# Patient Record
Sex: MALE | Race: BLACK OR AFRICAN AMERICAN | NOT HISPANIC OR LATINO | Employment: FULL TIME | URBAN - METROPOLITAN AREA
[De-identification: names, ages, dates, MRNs, and addresses within clinical notes are randomized per-mention and may not be internally consistent; named-entity substitution may affect disease eponyms.]

---

## 2020-03-06 ENCOUNTER — HOSPITAL ENCOUNTER (INPATIENT)
Facility: CLINIC | Age: 40
LOS: 4 days | Discharge: HOME | DRG: 076 | End: 2020-03-10
Attending: EMERGENCY MEDICINE | Admitting: INTERNAL MEDICINE
Payer: COMMERCIAL

## 2020-03-06 ENCOUNTER — APPOINTMENT (OUTPATIENT)
Dept: GENERAL RADIOLOGY | Facility: CLINIC | Age: 40
DRG: 076 | End: 2020-03-06
Attending: EMERGENCY MEDICINE
Payer: COMMERCIAL

## 2020-03-06 DIAGNOSIS — R51.9 ACUTE NONINTRACTABLE HEADACHE, UNSPECIFIED HEADACHE TYPE: ICD-10-CM

## 2020-03-06 DIAGNOSIS — G03.9 MENINGITIS: Primary | ICD-10-CM

## 2020-03-06 DIAGNOSIS — B34.9 VIRAL SYNDROME: ICD-10-CM

## 2020-03-06 DIAGNOSIS — A87.2 LYMPHOCYTIC MENINGITIS: ICD-10-CM

## 2020-03-06 DIAGNOSIS — D50.9 MICROCYTIC ANEMIA: ICD-10-CM

## 2020-03-06 LAB
ANION GAP SERPL CALCULATED.3IONS-SCNC: 5 MMOL/L (ref 3–14)
APPEARANCE CSF: CLEAR
BASOPHILS # BLD AUTO: 0 10E9/L (ref 0–0.2)
BASOPHILS NFR BLD AUTO: 0.6 %
BUN SERPL-MCNC: 9 MG/DL (ref 7–30)
CALCIUM SERPL-MCNC: 8.6 MG/DL (ref 8.5–10.1)
CHLORIDE SERPL-SCNC: 106 MMOL/L (ref 94–109)
CO2 SERPL-SCNC: 26 MMOL/L (ref 20–32)
COLOR CSF: COLORLESS
CREAT SERPL-MCNC: 1 MG/DL (ref 0.66–1.25)
DIFFERENTIAL METHOD BLD: ABNORMAL
EOSINOPHIL # BLD AUTO: 0.1 10E9/L (ref 0–0.7)
EOSINOPHIL NFR BLD AUTO: 0.9 %
EOSINOPHIL NFR CSF MANUAL: 1 %
ERYTHROCYTE [DISTWIDTH] IN BLOOD BY AUTOMATED COUNT: 17 % (ref 10–15)
FLUAV+FLUBV AG SPEC QL: NEGATIVE
FLUAV+FLUBV AG SPEC QL: NEGATIVE
GFR SERPL CREATININE-BSD FRML MDRD: >90 ML/MIN/{1.73_M2}
GLUCOSE CSF-MCNC: 31 MG/DL (ref 40–70)
GLUCOSE SERPL-MCNC: 75 MG/DL (ref 70–99)
GRAM STN SPEC: NORMAL
HCT VFR BLD AUTO: 35.4 % (ref 40–53)
HGB BLD-MCNC: 10.6 G/DL (ref 13.3–17.7)
IMM GRANULOCYTES # BLD: 0 10E9/L (ref 0–0.4)
IMM GRANULOCYTES NFR BLD: 0.2 %
LYMPH ABN NFR CSF MANUAL: 73 %
LYMPHOCYTES # BLD AUTO: 0.9 10E9/L (ref 0.8–5.3)
LYMPHOCYTES NFR BLD AUTO: 17.6 %
MCH RBC QN AUTO: 20.9 PG (ref 26.5–33)
MCHC RBC AUTO-ENTMCNC: 29.9 G/DL (ref 31.5–36.5)
MCV RBC AUTO: 70 FL (ref 78–100)
MONOCYTES # BLD AUTO: 0.5 10E9/L (ref 0–1.3)
MONOCYTES NFR BLD AUTO: 8.9 %
MONOS+MACROS NFR CSF MANUAL: 25 %
NEUTROPHILS # BLD AUTO: 3.8 10E9/L (ref 1.6–8.3)
NEUTROPHILS NFR BLD AUTO: 71.8 %
NEUTROPHILS NFR CSF MANUAL: 1 %
NRBC # BLD AUTO: 0 10*3/UL
NRBC BLD AUTO-RTO: 0 /100
PLATELET # BLD AUTO: 218 10E9/L (ref 150–450)
POTASSIUM SERPL-SCNC: 3.7 MMOL/L (ref 3.4–5.3)
PROT CSF-MCNC: 99 MG/DL (ref 15–60)
RBC # BLD AUTO: 5.07 10E12/L (ref 4.4–5.9)
RBC # CSF MANUAL: 4 /UL (ref 0–2)
S PNEUM AG SPEC QL: NORMAL
SODIUM SERPL-SCNC: 137 MMOL/L (ref 133–144)
SPECIMEN SOURCE: NORMAL
SPECIMEN VOL CSF: 8 ML
TUBE # CSF: 4 #
WBC # BLD AUTO: 5.3 10E9/L (ref 4–11)
WBC # CSF MANUAL: 645 /UL (ref 0–5)

## 2020-03-06 PROCEDURE — 87529 HSV DNA AMP PROBE: CPT | Performed by: EMERGENCY MEDICINE

## 2020-03-06 PROCEDURE — 62270 DX LMBR SPI PNXR: CPT

## 2020-03-06 PROCEDURE — 12000000 ZZH R&B MED SURG/OB

## 2020-03-06 PROCEDURE — 87015 SPECIMEN INFECT AGNT CONCNTJ: CPT | Performed by: EMERGENCY MEDICINE

## 2020-03-06 PROCEDURE — 87070 CULTURE OTHR SPECIMN AEROBIC: CPT | Performed by: EMERGENCY MEDICINE

## 2020-03-06 PROCEDURE — 96361 HYDRATE IV INFUSION ADD-ON: CPT

## 2020-03-06 PROCEDURE — 009U3ZX DRAINAGE OF SPINAL CANAL, PERCUTANEOUS APPROACH, DIAGNOSTIC: ICD-10-PCS | Performed by: EMERGENCY MEDICINE

## 2020-03-06 PROCEDURE — 87205 SMEAR GRAM STAIN: CPT | Performed by: EMERGENCY MEDICINE

## 2020-03-06 PROCEDURE — 85025 COMPLETE CBC W/AUTO DIFF WBC: CPT | Performed by: EMERGENCY MEDICINE

## 2020-03-06 PROCEDURE — 80048 BASIC METABOLIC PNL TOTAL CA: CPT | Performed by: EMERGENCY MEDICINE

## 2020-03-06 PROCEDURE — 25800030 ZZH RX IP 258 OP 636: Performed by: INTERNAL MEDICINE

## 2020-03-06 PROCEDURE — 99223 1ST HOSP IP/OBS HIGH 75: CPT | Mod: AI | Performed by: INTERNAL MEDICINE

## 2020-03-06 PROCEDURE — 87899 AGENT NOS ASSAY W/OPTIC: CPT | Performed by: EMERGENCY MEDICINE

## 2020-03-06 PROCEDURE — 25800030 ZZH RX IP 258 OP 636: Performed by: EMERGENCY MEDICINE

## 2020-03-06 PROCEDURE — 96365 THER/PROPH/DIAG IV INF INIT: CPT

## 2020-03-06 PROCEDURE — 82945 GLUCOSE OTHER FLUID: CPT | Performed by: EMERGENCY MEDICINE

## 2020-03-06 PROCEDURE — 25000132 ZZH RX MED GY IP 250 OP 250 PS 637: Performed by: EMERGENCY MEDICINE

## 2020-03-06 PROCEDURE — 96375 TX/PRO/DX INJ NEW DRUG ADDON: CPT

## 2020-03-06 PROCEDURE — 99285 EMERGENCY DEPT VISIT HI MDM: CPT | Mod: 25

## 2020-03-06 PROCEDURE — 25000128 H RX IP 250 OP 636: Performed by: EMERGENCY MEDICINE

## 2020-03-06 PROCEDURE — 87804 INFLUENZA ASSAY W/OPTIC: CPT | Performed by: EMERGENCY MEDICINE

## 2020-03-06 PROCEDURE — 89051 BODY FLUID CELL COUNT: CPT | Performed by: EMERGENCY MEDICINE

## 2020-03-06 PROCEDURE — 87498 ENTEROVIRUS PROBE&REVRS TRNS: CPT | Performed by: EMERGENCY MEDICINE

## 2020-03-06 PROCEDURE — 71046 X-RAY EXAM CHEST 2 VIEWS: CPT

## 2020-03-06 PROCEDURE — 84157 ASSAY OF PROTEIN OTHER: CPT | Performed by: EMERGENCY MEDICINE

## 2020-03-06 RX ORDER — FENTANYL CITRATE 50 UG/ML
100 INJECTION, SOLUTION INTRAMUSCULAR; INTRAVENOUS ONCE
Status: COMPLETED | OUTPATIENT
Start: 2020-03-06 | End: 2020-03-06

## 2020-03-06 RX ORDER — LIDOCAINE HYDROCHLORIDE 10 MG/ML
INJECTION, SOLUTION EPIDURAL; INFILTRATION; INTRACAUDAL; PERINEURAL
Status: DISCONTINUED
Start: 2020-03-06 | End: 2020-03-06 | Stop reason: HOSPADM

## 2020-03-06 RX ORDER — METOCLOPRAMIDE HYDROCHLORIDE 5 MG/ML
10 INJECTION INTRAMUSCULAR; INTRAVENOUS ONCE
Status: COMPLETED | OUTPATIENT
Start: 2020-03-06 | End: 2020-03-06

## 2020-03-06 RX ORDER — ONDANSETRON 2 MG/ML
4 INJECTION INTRAMUSCULAR; INTRAVENOUS ONCE
Status: COMPLETED | OUTPATIENT
Start: 2020-03-06 | End: 2020-03-06

## 2020-03-06 RX ORDER — LIDOCAINE 40 MG/G
CREAM TOPICAL
Status: DISCONTINUED | OUTPATIENT
Start: 2020-03-06 | End: 2020-03-10 | Stop reason: HOSPADM

## 2020-03-06 RX ORDER — IBUPROFEN 600 MG/1
600 TABLET, FILM COATED ORAL EVERY 8 HOURS PRN
Qty: 30 TABLET | Refills: 0 | Status: SHIPPED | OUTPATIENT
Start: 2020-03-06 | End: 2020-03-07

## 2020-03-06 RX ORDER — AMOXICILLIN 250 MG
1 CAPSULE ORAL 2 TIMES DAILY PRN
Status: DISCONTINUED | OUTPATIENT
Start: 2020-03-06 | End: 2020-03-10 | Stop reason: HOSPADM

## 2020-03-06 RX ORDER — KETOROLAC TROMETHAMINE 15 MG/ML
15 INJECTION, SOLUTION INTRAMUSCULAR; INTRAVENOUS ONCE
Status: COMPLETED | OUTPATIENT
Start: 2020-03-06 | End: 2020-03-06

## 2020-03-06 RX ORDER — POTASSIUM CHLORIDE 1.5 G/1.58G
20-40 POWDER, FOR SOLUTION ORAL
Status: DISCONTINUED | OUTPATIENT
Start: 2020-03-06 | End: 2020-03-10 | Stop reason: HOSPADM

## 2020-03-06 RX ORDER — ACETAMINOPHEN 325 MG/1
650 TABLET ORAL EVERY 4 HOURS PRN
Status: DISCONTINUED | OUTPATIENT
Start: 2020-03-06 | End: 2020-03-10 | Stop reason: HOSPADM

## 2020-03-06 RX ORDER — CODEINE PHOSPHATE AND GUAIFENESIN 10; 100 MG/5ML; MG/5ML
1 SOLUTION ORAL EVERY 4 HOURS PRN
Qty: 120 ML | Refills: 0 | Status: SHIPPED | OUTPATIENT
Start: 2020-03-06

## 2020-03-06 RX ORDER — NAPROXEN 250 MG/1
250 TABLET ORAL 2 TIMES DAILY PRN
Status: DISCONTINUED | OUTPATIENT
Start: 2020-03-06 | End: 2020-03-10 | Stop reason: HOSPADM

## 2020-03-06 RX ORDER — DIPHENHYDRAMINE HYDROCHLORIDE 50 MG/ML
12.5 INJECTION INTRAMUSCULAR; INTRAVENOUS ONCE
Status: COMPLETED | OUTPATIENT
Start: 2020-03-06 | End: 2020-03-06

## 2020-03-06 RX ORDER — BISACODYL 10 MG
10 SUPPOSITORY, RECTAL RECTAL DAILY PRN
Status: DISCONTINUED | OUTPATIENT
Start: 2020-03-06 | End: 2020-03-10 | Stop reason: HOSPADM

## 2020-03-06 RX ORDER — PROCHLORPERAZINE 25 MG
25 SUPPOSITORY, RECTAL RECTAL EVERY 12 HOURS PRN
Status: DISCONTINUED | OUTPATIENT
Start: 2020-03-06 | End: 2020-03-10 | Stop reason: HOSPADM

## 2020-03-06 RX ORDER — ONDANSETRON 4 MG/1
4 TABLET, ORALLY DISINTEGRATING ORAL EVERY 6 HOURS PRN
Status: DISCONTINUED | OUTPATIENT
Start: 2020-03-06 | End: 2020-03-10 | Stop reason: HOSPADM

## 2020-03-06 RX ORDER — HYDROXYCHLOROQUINE SULFATE 200 MG/1
400 TABLET, FILM COATED ORAL
COMMUNITY

## 2020-03-06 RX ORDER — POTASSIUM CHLORIDE 29.8 MG/ML
20 INJECTION INTRAVENOUS
Status: DISCONTINUED | OUTPATIENT
Start: 2020-03-06 | End: 2020-03-10 | Stop reason: HOSPADM

## 2020-03-06 RX ORDER — ACETAMINOPHEN 500 MG
1000 TABLET ORAL ONCE
Status: COMPLETED | OUTPATIENT
Start: 2020-03-06 | End: 2020-03-06

## 2020-03-06 RX ORDER — POTASSIUM CL/LIDO/0.9 % NACL 10MEQ/0.1L
10 INTRAVENOUS SOLUTION, PIGGYBACK (ML) INTRAVENOUS
Status: DISCONTINUED | OUTPATIENT
Start: 2020-03-06 | End: 2020-03-10 | Stop reason: HOSPADM

## 2020-03-06 RX ORDER — CALCIUM CARBONATE 500 MG/1
1000 TABLET, CHEWABLE ORAL 4 TIMES DAILY PRN
Status: DISCONTINUED | OUTPATIENT
Start: 2020-03-06 | End: 2020-03-10 | Stop reason: HOSPADM

## 2020-03-06 RX ORDER — CEFTRIAXONE 2 G/1
2 INJECTION, POWDER, FOR SOLUTION INTRAMUSCULAR; INTRAVENOUS ONCE
Status: COMPLETED | OUTPATIENT
Start: 2020-03-06 | End: 2020-03-06

## 2020-03-06 RX ORDER — MORPHINE SULFATE 2 MG/ML
2-4 INJECTION, SOLUTION INTRAMUSCULAR; INTRAVENOUS
Status: DISCONTINUED | OUTPATIENT
Start: 2020-03-06 | End: 2020-03-10 | Stop reason: HOSPADM

## 2020-03-06 RX ORDER — OXYCODONE HYDROCHLORIDE 5 MG/1
5-10 TABLET ORAL
Status: DISCONTINUED | OUTPATIENT
Start: 2020-03-06 | End: 2020-03-10 | Stop reason: HOSPADM

## 2020-03-06 RX ORDER — NALOXONE HYDROCHLORIDE 0.4 MG/ML
.1-.4 INJECTION, SOLUTION INTRAMUSCULAR; INTRAVENOUS; SUBCUTANEOUS
Status: DISCONTINUED | OUTPATIENT
Start: 2020-03-06 | End: 2020-03-10 | Stop reason: HOSPADM

## 2020-03-06 RX ORDER — AMOXICILLIN 250 MG
2 CAPSULE ORAL 2 TIMES DAILY PRN
Status: DISCONTINUED | OUTPATIENT
Start: 2020-03-06 | End: 2020-03-10 | Stop reason: HOSPADM

## 2020-03-06 RX ORDER — POTASSIUM CHLORIDE 7.45 MG/ML
10 INJECTION INTRAVENOUS
Status: DISCONTINUED | OUTPATIENT
Start: 2020-03-06 | End: 2020-03-10 | Stop reason: HOSPADM

## 2020-03-06 RX ORDER — POTASSIUM CHLORIDE 1500 MG/1
20-40 TABLET, EXTENDED RELEASE ORAL
Status: DISCONTINUED | OUTPATIENT
Start: 2020-03-06 | End: 2020-03-10 | Stop reason: HOSPADM

## 2020-03-06 RX ORDER — CEFTRIAXONE 2 G/1
2 INJECTION, POWDER, FOR SOLUTION INTRAMUSCULAR; INTRAVENOUS EVERY 12 HOURS
Status: DISCONTINUED | OUTPATIENT
Start: 2020-03-07 | End: 2020-03-09

## 2020-03-06 RX ORDER — NAPROXEN SODIUM 220 MG
220 TABLET ORAL 2 TIMES DAILY PRN
COMMUNITY

## 2020-03-06 RX ORDER — CEFAZOLIN SODIUM 1 G/50ML
1250 SOLUTION INTRAVENOUS EVERY 8 HOURS
Status: DISCONTINUED | OUTPATIENT
Start: 2020-03-07 | End: 2020-03-07

## 2020-03-06 RX ORDER — SODIUM CHLORIDE, SODIUM LACTATE, POTASSIUM CHLORIDE, CALCIUM CHLORIDE 600; 310; 30; 20 MG/100ML; MG/100ML; MG/100ML; MG/100ML
INJECTION, SOLUTION INTRAVENOUS CONTINUOUS
Status: DISCONTINUED | OUTPATIENT
Start: 2020-03-06 | End: 2020-03-09

## 2020-03-06 RX ORDER — ONDANSETRON 2 MG/ML
4 INJECTION INTRAMUSCULAR; INTRAVENOUS EVERY 6 HOURS PRN
Status: DISCONTINUED | OUTPATIENT
Start: 2020-03-06 | End: 2020-03-10 | Stop reason: HOSPADM

## 2020-03-06 RX ORDER — PROCHLORPERAZINE MALEATE 5 MG
10 TABLET ORAL EVERY 6 HOURS PRN
Status: DISCONTINUED | OUTPATIENT
Start: 2020-03-06 | End: 2020-03-10 | Stop reason: HOSPADM

## 2020-03-06 RX ADMIN — SODIUM CHLORIDE 1000 ML: 9 INJECTION, SOLUTION INTRAVENOUS at 13:10

## 2020-03-06 RX ADMIN — SODIUM CHLORIDE, POTASSIUM CHLORIDE, SODIUM LACTATE AND CALCIUM CHLORIDE: 600; 310; 30; 20 INJECTION, SOLUTION INTRAVENOUS at 20:11

## 2020-03-06 RX ADMIN — METOCLOPRAMIDE HYDROCHLORIDE 10 MG: 5 INJECTION INTRAMUSCULAR; INTRAVENOUS at 13:41

## 2020-03-06 RX ADMIN — KETOROLAC TROMETHAMINE 15 MG: 15 INJECTION, SOLUTION INTRAMUSCULAR; INTRAVENOUS at 13:10

## 2020-03-06 RX ADMIN — VANCOMYCIN HYDROCHLORIDE 2500 MG: 5 INJECTION, POWDER, LYOPHILIZED, FOR SOLUTION INTRAVENOUS at 18:04

## 2020-03-06 RX ADMIN — CEFTRIAXONE 2 G: 2 INJECTION, POWDER, FOR SOLUTION INTRAMUSCULAR; INTRAVENOUS at 17:28

## 2020-03-06 RX ADMIN — DIPHENHYDRAMINE HYDROCHLORIDE 12.5 MG: 50 INJECTION, SOLUTION INTRAMUSCULAR; INTRAVENOUS at 13:41

## 2020-03-06 RX ADMIN — ACETAMINOPHEN 1000 MG: 500 TABLET, FILM COATED ORAL at 13:10

## 2020-03-06 RX ADMIN — FENTANYL CITRATE 100 MCG: 50 INJECTION, SOLUTION INTRAMUSCULAR; INTRAVENOUS at 14:55

## 2020-03-06 RX ADMIN — ONDANSETRON 4 MG: 2 INJECTION INTRAMUSCULAR; INTRAVENOUS at 14:29

## 2020-03-06 ASSESSMENT — ENCOUNTER SYMPTOMS
VOMITING: 0
CHILLS: 1
NAUSEA: 0
SORE THROAT: 0
RHINORRHEA: 0
DIAPHORESIS: 1
HEADACHES: 1
COUGH: 1
APPETITE CHANGE: 1
FEVER: 1
NECK STIFFNESS: 0

## 2020-03-06 ASSESSMENT — ACTIVITIES OF DAILY LIVING (ADL): ADLS_ACUITY_SCORE: 18

## 2020-03-06 NOTE — LETTER
March 6, 2020      To Whom It May Concern:      Harmeet Rose was seen in our Emergency Department today, 03/06/20.  I expect his condition to improve over the next 3-5 days.  He may return to work when improved.    Sincerely,        Santos Shankar MD

## 2020-03-06 NOTE — PROCEDURES
[facility] Procedure Note          Lumbar Puncture:      Time: 3:18 PM  Performed by: Lucio Sandoval MD  Authorized by: Lucio Sandoval MD    Indications: headache    Consent given by: Patient who states understanding of the procedure being performed after discussing the risks, benefits and alternatives.    Prior to the start of the procedure and with procedural staff participation, I verbally confirmed the patient s identity using two indicators, relevant allergies, that the procedure was appropriate and matched the consent or emergent situation, and that the correct equipment/implants were available. Immediately prior to starting the procedure I conducted the Time Out with the procedural staff and re-confirmed the patient s name, procedure, and site/side. (The Joint Commission universal protocol was followed.) Yes    Under sterile conditions the patient was positioned L Lateral decubitus with knees drawn up. Betadine solution and sterile drapes were utilized.  Local anesthetic at the site: 5 ml of lidocaine 1% without epinephrine from the LP tray  A 21 G  spinal needle was inserted at the L 3-4 interspace.  Opening Pressurewas not checked.  A total of 10mL of clear and colorless spinal fluid was obtained and sent to the laboratory.   After the needle was removed, a bandaid and pressure were applied and the patient was instructed to stay horizontal until the results were back.    Complications:  None    Patient tolerance: Patient tolerated the procedure well with no immediate complications.

## 2020-03-06 NOTE — ED TRIAGE NOTES
Headache has been getting progressively worse for 4 days, has a history of headaches which are usually relieved by aleve but that has not worked this time. ABCs intact.

## 2020-03-06 NOTE — ED PROVIDER NOTES
History     Chief Complaint:  Headache    HPI   Harmeet Rose is a 39 year old male who presents for evaluation of fever, cough, sore throat and gradually worsening headache. The patient reports constant, diffuse headache for the last 3 days.  He has had cold symptoms for the last 5 days.  He has been taking Aleve without any relief of pain, with his last dose last night. He denies any exacerbating factors including light or noise.  His wife reports that his fever yesterday as high as 102 Fahrenheit.  No recent fall or head injury. He endorses loss of appetite and chills/diaphoresis, but denies neck stiffness/neck pain, nausea, vomiting, or ear pain.  No chest pain or abdominal pain.  No shortness of breath.  He notes intermittent cough, but denies any rhinorrhea, rash, or other symptoms other than generalized malaise. The patient does not get headaches frequently. No numbness tingling or weakness.  No confusion or change in mental status.  No recent travel.  No known exposures to ill contacts or people positive for COVID-19. 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      Allergies:  Bees  Milk [Lac Bovis]  Shellfish Allergy     Medications:    Plaquenil   Omeprazole      Past Medical History:    Raynaud's phenomenon without gangrene  Obesity    Past Surgical History:    Gastric bypass    Family History:    Mother: diabetes  Sister: diabetes    Social History:  The patient was accompanied to the ED by a female .  Smoking Status: Never Smoker  Smokeless Tobacco: Never Used  Alcohol Use: Positive  Marital Status:   [2]     Review of Systems   Constitutional: Positive for appetite change (loss), chills, diaphoresis and fever.   HENT: Negative for ear pain, rhinorrhea and sore throat.    Respiratory: Positive for cough.    Gastrointestinal: Negative for nausea and vomiting.   Musculoskeletal: Negative for neck  stiffness.   Skin: Negative for rash.   Neurological: Positive for headaches.   All other systems reviewed and are negative.      Physical Exam     Patient Vitals for the past 24 hrs:   BP Temp Temp src Pulse Resp SpO2 Weight   03/06/20 1345 -- 99.1  F (37.3  C) Oral -- -- -- --   03/06/20 1332 -- -- -- 81 -- -- --   03/06/20 1312 -- -- -- -- -- 100 % --   03/06/20 1311 -- -- -- -- -- 100 % --   03/06/20 1310 -- -- -- -- -- 99 % --   03/06/20 1300 (!) 159/97 -- -- (!) 37 -- 100 % --   03/06/20 1138 (!) 154/92 100.8  F (38.2  C) Oral 80 20 100 % 106.2 kg (234 lb 2.1 oz)        Physical Exam  General: Nontoxic. Resting comfortably  Head:  Scalp, face, and head appear normal  Eyes:  Pupils are equal, round, and reactive to light, EOMI, no nystagmus     Conjunctivae non-injected and sclerae white  ENT:    The external nose is normal    Pinnae are normal    The oropharynx is normal, mucous membranes moist    Posterior pharynx clear without swelling, exudates or erythema     Uvula is in the midline  Neck:  Normal range of motion    There is no rigidity noted    Trachea is in the midline  CV:  Regular rate and rhythm     Normal S1/S2, no S3/S4    No murmur or rub. Radial pulses 2+ bilaterally   Resp:  Lungs are clear and equal bilaterally    There is no tachypnea    No increased work of breathing    No rales, wheezing, or rhonchi  GI:  Abdomen is soft, no rigidity or guarding    No distension, or mass    No tenderness or rebound tenderness   MS:  Normal muscular tone    Symmetric motor strength    No lower extremity edema  Skin:  No rash or acute skin lesions noted  Neuro: A&Ox3, GCS 15    CN II - XII intact    Speech clear, fluent, and normal    Strength 5/5 and symmetric in bilateral upper and lower extremities.    No pronator drift. No leg drift. SILT throughout.    No ankle clonus    FTN testing normal. No tremor.     No meningismus   Psych:  Normal affect.  Appropriate interactions.      Emergency Department Course      Laboratory:  CBC: WBC: 5.3, HGB 10.6 (L), PLT: 218    BMP: all WNL (Creatinine: 1.00)    Influenza A/B Antigen: both Negative      CSF Culture Aerobic Bacterial: pending    Protein Total CSF: pending    Cell count with differential CSF: pending    Glucose CSF: pending    Gram stain: pending    [facility]    -Lumbar Puncture  Date/Time: 3/6/2020 3:16 PM  Performed by: Santos Shankar MD  Authorized by: Santos Shankar MD       PRE-PROCEDURE DETAILS:     Procedure purpose:  Diagnostic    ANESTHESIA (see MAR for exact dosages):     Anesthesia method:  Local infiltration    Local anesthetic:  Lidocaine 1% w/o epi      PROCEDURE DETAILS:     Lumbar space:  L3-L4 interspace (and L4-L5 interspace)    Patient position:  Sitting    Needle gauge:  22    Needle type:  Spinal needle - Quincke tip    Needle length (in):  3.5    Ultrasound guidance: no      Number of attempts:  5 or more  PROCEDURE Describe Procedure: Despite multiple attempt, the procedure was unsuccessful.   My, partner, Dr. Sandoval then attempted the procedure, and he was successful.   :    Interventions:  1310 Tylenol 1000 mg PO  1310 Toradol 15 mg IV  1310 NS 1000 mL IV Bolus  1341 Reglan 10 mg IV  1341 Benadryl 12.5 mg IV  1429 Zofran 4 mg IV  1455 Fentanyl 100 mcg IV    Emergency Department Course:   Past medical records, nursing notes, and vitals reviewed.  1240: I performed an exam of the patient and obtained history, as documented above.    IV was inserted and blood was drawn for laboratory testing, results above.     Medication and IV fluids administered.     1335 I rechecked and updated the patient.      1355 I performed a lumbar puncture, details above. I discussed the results of the patient's workup thus far.     The patient [facility]     Impression & Plan        Medical Decision Making:  Harmeet Rose is a 39 year old male who is well-appearing and nontoxic with viral URI  symptoms including cough, sore throat fever, malaise and fatigue.  His presenting complaint today is for gradually increasing significant headache.  On my evaluation he does not have any focal neurologic deficits.  No confusion or encephalopathy.  Broad differential diagnosis is considered including viral syndrome, influenza, influenza-like illness, viral meningitis, bacterial meningitis.  Patient has no increased work of breathing or respiratory distress.  Pneumonia is considered but felt to be less likely.  Work-up in the emergency department revealed negative rapid influenza testing.  CBC with mild microcytic anemia.  No leukocytosis. BMP is completely normal.  Patient has not had any recent travel or exposure to any one known to have COVID-19.  No abdominal symptoms or urinary symptoms to suggest primary intra-abdominal infection or urinary tract infection.  Ultimately given his very well appearance and multiple days of symptoms I feel that bacterial meningitis is clinically less likely but cannot rule this out completely.  Therefore I recommended lumbar puncture.  The patient was agreeable with the plan of care and lumbar puncture was attempted.  Unfortunately I was not able to successfully obtain the CSF.  Thankfully my partner Dr. Sandoval graciously attempted and was successful.  This was sent to the lab for testing.  Given his overall well clinical picture and the high index of suspicion for viral syndrome or viral meningitis I would await CSF results before empirically treating with broad-spectrum antibiotics.  He symptomatically felt improved and his fever improved with the above interventions.  If the initial CSF results return consistent with viral meningitis or normal then I feel the patient would be safe for discharge home with supportive care and close outpatient follow-up.  Of course that the CSF was concerning for bacterial meningitis he would require broad-spectrum antibiotics and admission to the  [facility]. The patient was signed out to Dr. Garza pending CSF results and x-ray results to rule out pneumonia.  The patient was informed of his anemia and the need for outpatient follow-up and recheck of this once his acute illness is resolved.  He denies any bleeding.  Patient was signed out in stable condition.    Diagnosis:    ICD-10-CM    1. Viral syndrome B34.9    2. Acute nonintractable headache, unspecified headache type R51    3. Microcytic anemia D50.9        Disposition:  Signed out to Dr. Garza, the [facility] emergency physician     Discharge Medications:  New Prescriptions    ACETAMINOPHEN 500 MG CAPS    Take 2 capsules by mouth every 8 hours as needed For aches, pain, fever    GUAIFENESIN-CODEINE (ROBITUSSIN AC) 100-10 MG/5ML SOLUTION    Take 5 mLs by mouth every 4 hours as needed for cough    IBUPROFEN (ADVIL/MOTRIN) 600 MG TABLET    Take 1 tablet (600 mg) by mouth every 8 hours as needed     I, Halie Hernández, am serving as a scribe on 3/6/2020 at 12:48 PM to personally document services performed by Santos Shankar MD based on my observations and the provider's statements to me.      Halie Hernández  3/6/2020   [facility] EMERGENCY DEPARTMENT       Santos Shankar MD  03/06/20 1194

## 2020-03-06 NOTE — ED NOTES
[facility]  ED Nurse Handoff Report    Harmeet Rose is a 39 year old male   ED Chief complaint: Headache  . ED Diagnosis:   Final diagnoses:   Viral syndrome   Acute nonintractable headache, unspecified headache type   Microcytic anemia     Allergies:   Allergies   Allergen Reactions     Bees      Milk [Lac Bovis]      Shellfish Allergy Swelling and Rash       Code Status: Full Code  Activity level - Baseline/Home:  Independent. Activity Level - Current:   Stand by Assist. Lift room needed: No. Bariatric: No   Needed: No   Isolation: Yes. Infection: Viral vs Bacterial meningitis.     Vital Signs:   Vitals:    03/06/20 1311 03/06/20 1312 03/06/20 1332 03/06/20 1345   BP:       Pulse:   81    Resp:       Temp:    99.1  F (37.3  C)   TempSrc:    Oral   SpO2: 100% 100%     Weight:           Cardiac Rhythm:  ,      Pain level: 0-10 Pain Scale: 8  Patient confused: No. Patient Falls Risk: Yes.   Elimination Status: Has voided   Patient Report - Initial Complaint: headache, fatigue. Focused Assessment: Headache, fatigue, generalized weakness, nausea   Tests Performed:   Labs Ordered and Resulted from Time of ED Arrival Up to the Time of Departure from the ED   CBC WITH PLATELETS DIFFERENTIAL - Abnormal; Notable for the following components:       Result Value    Hemoglobin 10.6 (*)     Hematocrit 35.4 (*)     MCV 70 (*)     MCH 20.9 (*)     MCHC 29.9 (*)     RDW 17.0 (*)     All other components within normal limits   GLUCOSE CSF - Abnormal; Notable for the following components:    Glucose CSF 31 (*)     All other components within normal limits   PROTEIN TOTAL CSF - Abnormal; Notable for the following components:    Protein Total CSF 99 (*)     All other components within normal limits   CELL COUNT WITH DIFFERENTIAL CSF - Abnormal; Notable for the following components:    WBC  (*)     RBC CSF 4 (*)     All other components within normal limits   BASIC METABOLIC PANEL   INFLUENZA A/B  ANTIGEN   GRAM STAIN   CSF CULTURE AEROBIC BACTERIAL   ENTEROVIRUS PCR CSF   HSV TYPES 1 AND 2 QUALITATIVE PCR CSF   STREP PNEUMO AGN URINE OR CSF     XR Chest 2 Views   Final Result   IMPRESSION: There are no acute infiltrates. The cardiac silhouette is   not enlarged. Pulmonary vasculature is unremarkable.      SHANNAN CHATTERJEE MD        . Abnormal Results: See above.   Treatments provided: IVF, diagnostics  Family Comments: N/A at the moment  OBS brochure/video discussed/provided to patient:  N/A  ED Medications:   Medications   lidocaine (PF) (XYLOCAINE) 1 % injection (has no administration in time range)   cefTRIAXone (ROCEPHIN) 2 g vial to attach to  ml bag for ADULTS or NS 50 ml bag for PEDS (has no administration in time range)   acetaminophen (TYLENOL) tablet 1,000 mg (1,000 mg Oral Given 3/6/20 1310)   ketorolac (TORADOL) injection 15 mg (15 mg Intravenous Given 3/6/20 1310)   0.9% sodium chloride BOLUS (0 mLs Intravenous Stopped 3/6/20 1429)   metoclopramide (REGLAN) injection 10 mg (10 mg Intravenous Given 3/6/20 1341)   diphenhydrAMINE (BENADRYL) injection 12.5 mg (12.5 mg Intravenous Given 3/6/20 1341)   ondansetron (ZOFRAN) injection 4 mg (4 mg Intravenous Given 3/6/20 1429)   fentaNYL (PF) (SUBLIMAZE) injection 100 mcg (100 mcg Intravenous Given 3/6/20 1455)     Drips infusing:  No  For the majority of the shift, the patient's behavior Green. Interventions performed were N/A.    Sepsis treatment initiated: No       ED Nurse Name/Phone Number: Miguel Carver RN,   5:17 PM    RECEIVING UNIT ED HANDOFF REVIEW    Above ED Nurse Handoff Report was reviewed: Yes  Reviewed by: Lianna South RN on March 6, 2020 at 6:22 PM

## 2020-03-07 PROBLEM — G44.89 OTHER HEADACHE SYNDROME: Status: ACTIVE | Noted: 2020-03-07

## 2020-03-07 LAB
CREAT SERPL-MCNC: 0.99 MG/DL (ref 0.66–1.25)
EV RNA SPEC QL NAA+PROBE: NEGATIVE
GFR SERPL CREATININE-BSD FRML MDRD: >90 ML/MIN/{1.73_M2}
HSV1 DNA CSF QL NAA+PROBE: NOT DETECTED
HSV2 DNA CSF QL NAA+PROBE: NOT DETECTED
MICROBIOLOGIST REVIEW: NORMAL
SPECIMEN SOURCE: NORMAL
VANCOMYCIN SERPL-MCNC: 11.6 MG/L

## 2020-03-07 PROCEDURE — 25000128 H RX IP 250 OP 636: Performed by: INTERNAL MEDICINE

## 2020-03-07 PROCEDURE — 25000132 ZZH RX MED GY IP 250 OP 250 PS 637: Performed by: INTERNAL MEDICINE

## 2020-03-07 PROCEDURE — 25800030 ZZH RX IP 258 OP 636: Performed by: INTERNAL MEDICINE

## 2020-03-07 PROCEDURE — 99232 SBSQ HOSP IP/OBS MODERATE 35: CPT | Performed by: INTERNAL MEDICINE

## 2020-03-07 PROCEDURE — 82565 ASSAY OF CREATININE: CPT | Performed by: INTERNAL MEDICINE

## 2020-03-07 PROCEDURE — 12000000 ZZH R&B MED SURG/OB

## 2020-03-07 PROCEDURE — 36415 COLL VENOUS BLD VENIPUNCTURE: CPT | Performed by: INTERNAL MEDICINE

## 2020-03-07 PROCEDURE — 80202 ASSAY OF VANCOMYCIN: CPT | Performed by: INTERNAL MEDICINE

## 2020-03-07 RX ADMIN — CEFTRIAXONE 2 G: 2 INJECTION, POWDER, FOR SOLUTION INTRAMUSCULAR; INTRAVENOUS at 07:22

## 2020-03-07 RX ADMIN — OXYCODONE HYDROCHLORIDE 5 MG: 5 TABLET ORAL at 18:18

## 2020-03-07 RX ADMIN — SODIUM CHLORIDE, POTASSIUM CHLORIDE, SODIUM LACTATE AND CALCIUM CHLORIDE: 600; 310; 30; 20 INJECTION, SOLUTION INTRAVENOUS at 08:25

## 2020-03-07 RX ADMIN — CEFTRIAXONE 2 G: 2 INJECTION, POWDER, FOR SOLUTION INTRAMUSCULAR; INTRAVENOUS at 18:18

## 2020-03-07 RX ADMIN — VANCOMYCIN HYDROCHLORIDE 1250 MG: 10 INJECTION, POWDER, LYOPHILIZED, FOR SOLUTION INTRAVENOUS at 04:33

## 2020-03-07 RX ADMIN — OXYCODONE HYDROCHLORIDE 5 MG: 5 TABLET ORAL at 05:00

## 2020-03-07 RX ADMIN — VANCOMYCIN HYDROCHLORIDE 1250 MG: 10 INJECTION, POWDER, LYOPHILIZED, FOR SOLUTION INTRAVENOUS at 12:28

## 2020-03-07 RX ADMIN — ACETAMINOPHEN 650 MG: 325 TABLET, FILM COATED ORAL at 00:02

## 2020-03-07 RX ADMIN — SODIUM CHLORIDE, POTASSIUM CHLORIDE, SODIUM LACTATE AND CALCIUM CHLORIDE: 600; 310; 30; 20 INJECTION, SOLUTION INTRAVENOUS at 19:08

## 2020-03-07 RX ADMIN — VANCOMYCIN HYDROCHLORIDE 1250 MG: 10 INJECTION, POWDER, LYOPHILIZED, FOR SOLUTION INTRAVENOUS at 20:56

## 2020-03-07 RX ADMIN — OXYCODONE HYDROCHLORIDE 5 MG: 5 TABLET ORAL at 09:47

## 2020-03-07 RX ADMIN — OXYCODONE HYDROCHLORIDE 5 MG: 5 TABLET ORAL at 23:36

## 2020-03-07 ASSESSMENT — ACTIVITIES OF DAILY LIVING (ADL)
ADLS_ACUITY_SCORE: 35
ADLS_ACUITY_SCORE: 33
ADLS_ACUITY_SCORE: 33
ADLS_ACUITY_SCORE: 35

## 2020-03-07 NOTE — H&P
[facility]  History and Physical   Hospitalist Service    Dandre Castaneda MD    Harmeet Rose MRN# [id number]   YOB: 1980 Age: 39 year old      Date of Admission:  3/6/2020           Assessment and Plan:   Harmeet Rose is a 39-year-old male with history of Raynaud's, obesity, and gastric bypass.  He works as an  at [employer].  He presented to the emergency department today for evaluation of 5 days of cold symptoms and 3 days of progressive headache.  He had fever as high as 102 degrees at home.  He has had some cough and sore throat.  He has had no neck pain, nausea, vomiting, chest pain, or rash.  Emergency department evaluation showed temperature of 100.8 degrees but otherwise unremarkable vital signs.  Basic metabolic panel and CBC were unremarkable.  Influenza testing was negative.  Chest x-ray was obtained and was negative for acute process.  Given headache and fever lumbar puncture was performed.  It showed 645 white blood cells with 1% neutrophils and 73% lymphocytes.  Glucose, however, was low at 31.  Protein was elevated at 99.  Given the mixed appearance of cerebrospinal fluid analysis Harmeet was given Rocephin and vancomycin.  I was asked to admit him to the hospital with meningitis of viral versus bacterial origin.    Problem list:    1.  Meningitis.  I think this is probably viral meningitis.  His spinal fluid analysis is somewhat mixed, however, with low glucose.  Of note, Harmeet's serum glucose was on the low side at 75.  Admit as inpatient.  Continue high-dose Rocephin and vancomycin.  Follow final fluid cultures.  I will ask infectious disease to see.    Full code  Mechanical DVT prophylaxis  Disposition: Admit as inpatient           Code Status:   Full Code         Primary Care Physician:   [facility], [place] None         Chief Complaint:   Headache, fever, cough, and sore throat    History is obtained from  Harmeet, his wife, Dr. Garza, and the medical record         History of Present Illness:   Harmeet Rose is a 39-year-old male with history of Raynaud's, obesity, and gastric bypass.  He works as an  at [employer].  He presented to the emergency department today for evaluation of 5 days of cold symptoms and 3 days of progressive headache.  He had fever as high as 102 degrees at home.  He has had some cough and sore throat.  He has had no neck pain, nausea, vomiting, chest pain, or rash.  Emergency department evaluation showed temperature of 100.8 degrees but otherwise unremarkable vital signs.  Basic metabolic panel and CBC were unremarkable.  Influenza testing was negative.  Chest x-ray was obtained and was negative for acute process.  Given headache and fever lumbar puncture was performed.  It showed 645 white blood cells with 1% neutrophils and 73% lymphocytes.  Glucose, however, was low at 31.  Protein was elevated at 99.  Given the mixed appearance of cerebrospinal fluid analysis Harmeet was given Rocephin and vancomycin.  I was asked to admit him to the hospital with meningitis of viral versus bacterial origin.           Past Medical History:     Patient Active Problem List   Diagnosis     Lumbar pain     Other postprocedural status(V45.89)     Microcytic anemia     Meningitis   Raynaud's  Obesity          Past Surgical History:   Gastric bypass         Home Medications:     Prior to Admission medications    Medication Sig Last Dose Taking? Auth Provider   acetaminophen 500 MG CAPS Take 2 capsules by mouth every 8 hours as needed For aches, pain, fever  Yes Santos Shankar MD   cholecalciferol 25 MCG (1000 UT) TABS Take 2,000 Units by mouth daily 3/5/2020 at am Yes Unknown, Entered By History   guaiFENesin-codeine (ROBITUSSIN AC) 100-10 MG/5ML solution Take 5 mLs by mouth every 4 hours as needed for cough  Yes Santos Shankar MD   hydroxychloroquine  (PLAQUENIL) 200 MG tablet Take 400 mg by mouth daily (with dinner) 3/5/2020 at Unknown time Yes Unknown, Entered By History   ibuprofen (ADVIL/MOTRIN) 600 MG tablet Take 1 tablet (600 mg) by mouth every 8 hours as needed  Yes Santos Shankar MD   naproxen sodium (ANAPROX) 220 MG tablet Take 220 mg by mouth 2 times daily as needed for moderate pain  Yes Unknown, Entered By History            Allergies:     Allergies   Allergen Reactions     Bees      Milk [Lac Bovis]      Shellfish Allergy Swelling and Rash            Social History:     Never smoker  Some alcohol use          Family History:   Diabetes           Review of Systems:   The 10 point Review of Systems is negative other than as noted in the HPI.           Physical Exam:   Blood pressure (!) 140/70, pulse 75, temperature 98  F (36.7  C), temperature source Oral, resp. rate 18, height 1.829 m (6'), weight 106.2 kg (234 lb 2.1 oz), SpO2 100 %.  234 lbs 2.06 oz      GENERAL: Pleasant and cooperative. No acute distress.  EYES: Pupils equal and round. No scleral erythema or icterus.  ENT: External ears are normal without deformity. Posterior oropharynx is without erythem, swelling, or exudate.  NECK: Supple. No masses or swelling. No tenderness. Thyroid is normal without mass or tenderness.  CHEST: Clear to auscultation. Normal breath sounds. No retractions.   CV: Regular rate and rhythm. No JVD. Pulses normal.  ABDOMEN: Bowel sounds present. No tenderness. No masses or hernia.  EXTREMETIES: No clubbing, cyanosis, or ischemia.  SKIN: Warm and dry to touch. No wounds or rashes.  NEUROLOGIC: Strength and sensation are normal. Deep tendon reflexes are normal. Cranial nerves are normal.             Data:   All new lab and imaging data was reviewed.     Results for orders placed or performed during the hospital encounter of 03/06/20 (from the past 24 hour(s))   -Lumbar Puncture    Narrative    Santos Shankar MD     3/6/2020  3:48 PM  [facility]    -Lumbar Puncture  Date/Time: 3/6/2020 3:16 PM  Performed by: Santos Shankar MD  Authorized by: Santos Shankar MD       PRE-PROCEDURE DETAILS:     Procedure purpose:  Diagnostic    ANESTHESIA (see MAR for exact dosages):     Anesthesia method:  Local infiltration    Local anesthetic:  Lidocaine 1% w/o epi      PROCEDURE DETAILS:     Lumbar space:  L3-L4 interspace (and L4-L5 interspace)    Patient position:  Sitting    Needle gauge:  22    Needle type:  Spinal needle - Quincke tip    Needle length (in):  3.5    Ultrasound guidance: no      Number of attempts:  5 or more  PROCEDURE Describe Procedure: Despite multiple attempt, the procedure was   unsuccessful.   My, partner, Dr. Sandoval then attempted the procedure, and he was   successful.    Influenza A/B antigen   Result Value Ref Range    Influenza A/B Agn Specimen Nasal     Influenza A Negative NEG^Negative    Influenza B Negative NEG^Negative   CBC with platelets differential   Result Value Ref Range    WBC 5.3 4.0 - 11.0 10e9/L    RBC Count 5.07 4.4 - 5.9 10e12/L    Hemoglobin 10.6 (L) 13.3 - 17.7 g/dL    Hematocrit 35.4 (L) 40.0 - 53.0 %    MCV 70 (L) 78 - 100 fl    MCH 20.9 (L) 26.5 - 33.0 pg    MCHC 29.9 (L) 31.5 - 36.5 g/dL    RDW 17.0 (H) 10.0 - 15.0 %    Platelet Count 218 150 - 450 10e9/L    Diff Method Automated Method     % Neutrophils 71.8 %    % Lymphocytes 17.6 %    % Monocytes 8.9 %    % Eosinophils 0.9 %    % Basophils 0.6 %    % Immature Granulocytes 0.2 %    Nucleated RBCs 0 0 /100    Absolute Neutrophil 3.8 1.6 - 8.3 10e9/L    Absolute Lymphocytes 0.9 0.8 - 5.3 10e9/L    Absolute Monocytes 0.5 0.0 - 1.3 10e9/L    Absolute Eosinophils 0.1 0.0 - 0.7 10e9/L    Absolute Basophils 0.0 0.0 - 0.2 10e9/L    Abs Immature Granulocytes 0.0 0 - 0.4 10e9/L    Absolute Nucleated RBC 0.0    Basic metabolic panel   Result Value Ref Range    Sodium 137 133 - 144 mmol/L    Potassium 3.7 3.4 - 5.3 mmol/L    Chloride 106 94 - 109 mmol/L    Carbon  Dioxide 26 20 - 32 mmol/L    Anion Gap 5 3 - 14 mmol/L    Glucose 75 70 - 99 mg/dL    Urea Nitrogen 9 7 - 30 mg/dL    Creatinine 1.00 0.66 - 1.25 mg/dL    GFR Estimate >90 >60 mL/min/[1.73_m2]    GFR Estimate If Black >90 >60 mL/min/[1.73_m2]    Calcium 8.6 8.5 - 10.1 mg/dL   Glucose CSF:   Result Value Ref Range    Glucose CSF 31 (LL) 40 - 70 mg/dL   Protein total CSF:   Result Value Ref Range    Protein Total CSF 99 (H) 15 - 60 mg/dL   Gram stain   Result Value Ref Range    Specimen Description Cerebrospinal fluid     Gram Stain No organisms seen     Gram Stain       Moderate  WBC'S seen  predominantly mononuclear cells      Gram Stain       Quantification of host cells and microbiological organisms was done on a cytocentrifuged   preparation.      Gram Stain       Gram stain review consistent with reported results.  Gram stain slide reviewed at the [facility]     Cell count with differential CSF:   Result Value Ref Range    WBC  (H) 0 - 5 /uL    RBC CSF 4 (H) 0 - 2 /uL    % Neutrophils CSF 1 %    % Lymphocytes CSF 73 %    % Mono/Macros CSF 25 %    % Eosinophils CSF 1 %    Tube Number 4 #    Volume 8 mL    Color CSF Colorless CLRL^Colorless    Appearance CSF Clear CLER^Clear   XR Chest 2 Views    Narrative    CHEST TWO VIEWS 3/6/2020 3:54 PM     HISTORY: Fever.    COMPARISON: October 12, 2014       Impression    IMPRESSION: There are no acute infiltrates. The cardiac silhouette is  not enlarged. Pulmonary vasculature is unremarkable.    SHANNAN CHATTERJEE MD

## 2020-03-07 NOTE — PROGRESS NOTES
Pt arrived to floor from ED.  Ambulated into room with SBA.  Denies feeling dizzy or lightheaded.  Denies pain.  Reports no longer has headache at this time. Denies nausea. Dressing intact from lumbar puncture.  Cultures pending.  Vanco infusing. Oriented to room and call light.  Wife at bedside.  Dr. Castaneda paged for orders.

## 2020-03-07 NOTE — PROGRESS NOTES
Pt is alert and oriented, mild Temp.up with SBA. Regular diet,passing flatus,hypoactive bowel sounds.Pain/headaches controled with prn Tylenol/Oxy.iv infusing. Dressing intact. Continues on Vanco. and Rocephin. Family by bedside.Vodidng adequate amounts in the bathroom. Will continue to monitor.

## 2020-03-07 NOTE — ED NOTES
Asked by  to follow up on LP results. Inc wbcs and low glucose of 31. Mixed picture in terms of viral vs bacterial. Gram stain neg so far. D/w family the results. Will admit for culture and antibiotic. Still could be viral given lymphocyte predominance but dec glucose is concerning.     Ewa Garza MD  03/06/20 3954

## 2020-03-07 NOTE — PROGRESS NOTES
[facility]  Hospitalist Progress Note  Sameer Mitchell MD 03/07/2020    Reason for Stay (Diagnosis): meningitis         Assessment and Plan:      Summary of Stay: Harmeet Rose is a 39-year-old male with history of Raynaud's, obesity, and gastric bypass.  He works as an  at [employer].  He presented to the emergency department today for evaluation of 5 days of cold symptoms and 3 days of progressive headache.  He had fever as high as 102 degrees at home.  He has had some cough and sore throat.  He has had no neck pain, nausea, vomiting, chest pain, or rash.  Emergency department evaluation showed temperature of 100.8 degrees but otherwise unremarkable vital signs.  Basic metabolic panel and CBC were unremarkable.  Influenza testing was negative.  Chest x-ray was obtained and was negative for acute process.  Given headache and fever lumbar puncture was performed.  It showed 645 white blood cells with 1% neutrophils and 73% lymphocytes.  Glucose, however, was low at 31.  Protein was elevated at 99.  Given the mixed appearance of cerebrospinal fluid analysis Harmeet was given Rocephin and vancomycin and admitted to the hospitalist service to rule out septic meningitis.    HSV has returned negative.  cx pending     Problem list:     1.  Meningitis. Suspect aseptic (viral) meningitis given predominance of lymphocytes and duration of sx.  remains on IV vanco and IV rocephin.  cx pending.  HSV and enterovirus neg.  ID consulted    Full code  Mechanical DVT prophylaxis  Disposition: home anticipated; discharge when cx returns neg, or sooner if OK with ID          Interval History (Subjective):      Pt asking if he can go home today.  Sick for several days prior to presenting.  No known sick contacts.                    Physical Exam:      Last Vital Signs:  /83 (BP Location: Right arm)   Pulse 68   Temp 99.9  F (37.7  C) (Temporal)   Resp  18   Ht 1.829 m (6')   Wt 106.2 kg (234 lb 2.1 oz)   SpO2 100%   BMI 31.75 kg/m      No intake or output data in the 24 hours ending 03/07/20 1536    Constitutional: Awake, alert, cooperative, no apparent distress.    Respiratory: Clear to auscultation bilaterally, no crackles or wheezing   Cardiovascular: Regular rate and rhythm, normal S1 and S2, and no murmur noted   Abdomen: Normal bowel sounds, soft, non-distended, non-tender   Skin: No rashes, no cyanosis, dry to touch   Neuro: Alert and oriented x3, no weakness, numbness, memory loss   Extremities: No edema, normal range of motion   Other(s):        All other systems: Negative          Medications:      All current medications were reviewed with changes reflected in problem list.         Data:      All new lab and imaging data was reviewed.   Labs:  Recent Labs   Lab 03/06/20  1241   WBC 5.3   HGB 10.6*   HCT 35.4*   MCV 70*         Imaging:   Recent Results (from the past 24 hour(s))   XR Chest 2 Views    Narrative    CHEST TWO VIEWS 3/6/2020 3:54 PM     HISTORY: Fever.    COMPARISON: October 12, 2014       Impression    IMPRESSION: There are no acute infiltrates. The cardiac silhouette is  not enlarged. Pulmonary vasculature is unremarkable.    SHANNAN CHATTERJEE MD

## 2020-03-07 NOTE — PLAN OF CARE
A/O. CMS intact.  Oxycodone given x1 for headache. Voiding. IV fluids infusing, continues on IV Vanco/Rocephin.  Awaiting ID consult.  Max temp 99.9.  Up ad luz maria in room.  Will continue to monitor.

## 2020-03-07 NOTE — PHARMACY-VANCOMYCIN DOSING SERVICE
Pharmacy Vancomycin Initial Note  Date of Service 2020  Patient's  1980  39 year old, male    Indication: Meningitis    Current estimated CrCl = Estimated Creatinine Clearance: 124.8 mL/min (based on SCr of 1 mg/dL).    Creatinine for last 3 days  3/6/2020: 12:41 PM Creatinine 1.00 mg/dL    Recent Vancomycin Level(s) for last 3 days  No results found for requested labs within last 72 hours.      Vancomycin IV Administrations (past 72 hours)                   vancomycin (VANCOCIN) 2,500 mg in sodium chloride 0.9 % 500 mL intermittent infusion (mg) 2,500 mg New Bag 20 1804                Nephrotoxins and other renal medications (From now, onward)    Start     Dose/Rate Route Frequency Ordered Stop    20 0400  vancomycin 1250 mg in 0.9% NaCl 250 ml intermittent infusion       1,250 mg  over 90 Minutes Intravenous EVERY 8 HOURS 20 1930      20 1916  naproxen (NAPROSYN) tablet 250 mg      250 mg Oral 2 TIMES DAILY PRN 20 1920      20 1724  vancomycin (VANCOCIN) 2,500 mg in sodium chloride 0.9 % 500 mL intermittent infusion      2,500 mg  over 2 Hours Intravenous ONCE 20 1723      20 0000  ibuprofen (ADVIL/MOTRIN) 600 MG tablet      600 mg Oral EVERY 8 HOURS PRN 20 1546 20 2359          Contrast Orders - past 72 hours (72h ago, onward)    None                Plan:  1.  Continue vancomycin  1250 mg IV q8h.  First dose in ER 2,500mg.  2.  Goal Trough Level: 15-20 mg/L   3.  Pharmacy will check trough levels as appropriate in 1-3 Days.    4. Serum creatinine levels will be ordered daily for the first week of therapy and at least twice weekly for subsequent weeks.    5. [place] method utilized to dose vancomycin therapy: Method 1    Solis Graves RP

## 2020-03-07 NOTE — PHARMACY-ADMISSION MEDICATION HISTORY
Admission medication history interview status for this patient is complete. See [facility] admission navigator for allergy information, prior to admission medications and immunization status.     Medication history interview source(s):Patient and Family  Medication history resources (including written lists, pill bottles, clinic record):None  Primary pharmacy[facility]    Changes made to PTA medication list:  Added: all listed  Deleted: vit b12, mvi, oxycodone  Changed: -    Actions taken by pharmacist (provider contacted, etc):None     Additional medication history information:None    Medication reconciliation/reorder completed by provider prior to medication history?  No     Do you take OTC medications (eg tylenol, ibuprofen, fish oil, eye/ear drops, etc)? Yes     For patients on insulin therapy: no (Y/N)  Sliding scale Novolog: -  Do you have a baseline novolog pre-meal dose:  -  units with meals or -units/carb unit with meals  Do you eat three meals a day: -  How many times do you check your blood glucose per day:  -  How many episodes of hypoglycemia do you have per week: -  How many missed doses do you have per week: -  Do you have a Continuous glucose monitor (CGM) : - (remind pt that not approved for hospital use)  Any specific barriers to therapy? -  (cost, comfortable with injections, confident with current diabetes regimen?)      Prior to Admission medications    Medication Sig Last Dose Taking? Auth Provider   acetaminophen 500 MG CAPS Take 2 capsules by mouth every 8 hours as needed For aches, pain, fever  Yes Satnos Shankar MD   cholecalciferol 25 MCG (1000 UT) TABS Take 2,000 Units by mouth daily 3/5/2020 at am Yes Unknown, Entered By History   guaiFENesin-codeine (ROBITUSSIN AC) 100-10 MG/5ML solution Take 5 mLs by mouth every 4 hours as needed for cough  Yes Santos Shankar MD   hydroxychloroquine (PLAQUENIL) 200 MG tablet Take 400 mg by mouth daily (with dinner) 3/5/2020 at Unknown time Yes  Unknown, Entered By History   ibuprofen (ADVIL/MOTRIN) 600 MG tablet Take 1 tablet (600 mg) by mouth every 8 hours as needed  Yes Santos Shankar MD   naproxen sodium (ANAPROX) 220 MG tablet Take 220 mg by mouth 2 times daily as needed for moderate pain  Yes Unknown, Entered By History

## 2020-03-08 ENCOUNTER — APPOINTMENT (OUTPATIENT)
Dept: MRI IMAGING | Facility: CLINIC | Age: 40
DRG: 076 | End: 2020-03-08
Attending: INTERNAL MEDICINE
Payer: COMMERCIAL

## 2020-03-08 PROBLEM — A87.2 LYMPHOCYTIC MENINGITIS: Status: ACTIVE | Noted: 2020-03-08

## 2020-03-08 LAB
CREAT SERPL-MCNC: 0.89 MG/DL (ref 0.66–1.25)
CRYPTOC AG SPEC QL: NORMAL
GFR SERPL CREATININE-BSD FRML MDRD: >90 ML/MIN/{1.73_M2}
SPECIMEN SOURCE: NORMAL

## 2020-03-08 PROCEDURE — 82565 ASSAY OF CREATININE: CPT | Performed by: INTERNAL MEDICINE

## 2020-03-08 PROCEDURE — 25000132 ZZH RX MED GY IP 250 OP 250 PS 637: Performed by: INTERNAL MEDICINE

## 2020-03-08 PROCEDURE — 36415 COLL VENOUS BLD VENIPUNCTURE: CPT | Performed by: INTERNAL MEDICINE

## 2020-03-08 PROCEDURE — 25800030 ZZH RX IP 258 OP 636: Performed by: INTERNAL MEDICINE

## 2020-03-08 PROCEDURE — A9585 GADOBUTROL INJECTION: HCPCS | Performed by: INTERNAL MEDICINE

## 2020-03-08 PROCEDURE — 25000128 H RX IP 250 OP 636: Performed by: INTERNAL MEDICINE

## 2020-03-08 PROCEDURE — 99232 SBSQ HOSP IP/OBS MODERATE 35: CPT | Performed by: INTERNAL MEDICINE

## 2020-03-08 PROCEDURE — 25500064 ZZH RX 255 OP 636: Performed by: INTERNAL MEDICINE

## 2020-03-08 PROCEDURE — 12000000 ZZH R&B MED SURG/OB

## 2020-03-08 PROCEDURE — 70553 MRI BRAIN STEM W/O & W/DYE: CPT

## 2020-03-08 PROCEDURE — 87899 AGENT NOS ASSAY W/OPTIC: CPT | Performed by: INTERNAL MEDICINE

## 2020-03-08 PROCEDURE — 87385 HISTOPLASMA CAPSUL AG IA: CPT | Performed by: INTERNAL MEDICINE

## 2020-03-08 RX ORDER — GADOBUTROL 604.72 MG/ML
10 INJECTION INTRAVENOUS ONCE
Status: COMPLETED | OUTPATIENT
Start: 2020-03-08 | End: 2020-03-08

## 2020-03-08 RX ORDER — HYDROXYCHLOROQUINE SULFATE 200 MG/1
400 TABLET, FILM COATED ORAL
Status: DISCONTINUED | OUTPATIENT
Start: 2020-03-08 | End: 2020-03-10 | Stop reason: HOSPADM

## 2020-03-08 RX ADMIN — CEFTRIAXONE 2 G: 2 INJECTION, POWDER, FOR SOLUTION INTRAMUSCULAR; INTRAVENOUS at 18:16

## 2020-03-08 RX ADMIN — GADOBUTROL 10 ML: 604.72 INJECTION INTRAVENOUS at 16:40

## 2020-03-08 RX ADMIN — SODIUM CHLORIDE, POTASSIUM CHLORIDE, SODIUM LACTATE AND CALCIUM CHLORIDE: 600; 310; 30; 20 INJECTION, SOLUTION INTRAVENOUS at 15:48

## 2020-03-08 RX ADMIN — OXYCODONE HYDROCHLORIDE 5 MG: 5 TABLET ORAL at 11:51

## 2020-03-08 RX ADMIN — SODIUM CHLORIDE, POTASSIUM CHLORIDE, SODIUM LACTATE AND CALCIUM CHLORIDE: 600; 310; 30; 20 INJECTION, SOLUTION INTRAVENOUS at 06:01

## 2020-03-08 RX ADMIN — HYDROXYCHLOROQUINE SULFATE 400 MG: 200 TABLET ORAL at 18:16

## 2020-03-08 RX ADMIN — CEFTRIAXONE 2 G: 2 INJECTION, POWDER, FOR SOLUTION INTRAMUSCULAR; INTRAVENOUS at 05:50

## 2020-03-08 RX ADMIN — OXYCODONE HYDROCHLORIDE 5 MG: 5 TABLET ORAL at 18:16

## 2020-03-08 ASSESSMENT — ACTIVITIES OF DAILY LIVING (ADL)
ADLS_ACUITY_SCORE: 35

## 2020-03-08 NOTE — PROGRESS NOTES
[facility]  Hospitalist Progress Note  Sameer Mitchell MD 03/08/2020    Reason for Stay (Diagnosis): meningitis         Assessment and Plan:      Summary of Stay: Harmeet Rose is a 39-year-old male with history of Raynaud's, obesity, and gastric bypass.  He works as an  at [employer].  He presented to the emergency department today for evaluation of 5 days of cold symptoms and 3 days of progressive headache.  He had fever as high as 102 degrees at home.  He has had some cough and sore throat.  He has had no neck pain, nausea, vomiting, chest pain, or rash.  Emergency department evaluation showed temperature of 100.8 degrees but otherwise unremarkable vital signs.  Basic metabolic panel and CBC were unremarkable.  Influenza testing was negative.  Chest x-ray was obtained and was negative for acute process.  Given headache and fever lumbar puncture was performed.  It showed 645 white blood cells with 1% neutrophils and 73% lymphocytes.  Glucose, however, was low at 31.  Protein was elevated at 99.  Given the mixed appearance of cerebrospinal fluid analysis Harmeet was given Rocephin and vancomycin and admitted to the hospitalist service to rule out septic meningitis.     HSV has returned negative.  cx pending.  Remains on Rocephin.      I spoke with ID today (Dr. Fu).  Etiology of meningitis remains unclear.  He recommends brain MRI and consideration of repeat LP tomorrow if sx persist.      Pt and spouse reported today that he has a hx of discoid lupus and was taking Plaquenil up until several weeks ago when he stopped the medication.  They are wondering if this could be related to his current sx.       Problem list:     1.  Meningitis. Suspect aseptic (? viral) meningitis.  remains on IV rocephin - spoke with ID and can stop Rocephin today.  cx ngtd and course not c/w septic cause.  HSV and enterovirus neg.  Given ongoing sx (LGF and  HA) ID recommends brain MRI and consideration of repeat LP tomorrow.  ID has also ordered studies for cryptococcus and histoplasmosis and recommend pt remain in hospital for now    2.  Hx of discoid lupus:  Resume Plaquenil      Full code  Mechanical DVT prophylaxis  Disposition: home anticipated; discharge when sx improved and/or OK with ID        Interval History (Subjective):      HA persists.  Continues to have LGF.  HA improved with oxycodone.  Reports hx of discoid lupus; was taking Plaquenil up until 2 weeks ago when he stopped the medication                  Physical Exam:      Last Vital Signs:  /89 (BP Location: Right arm)   Pulse 72   Temp 98.6  F (37  C) (Temporal)   Resp 18   Ht 1.829 m (6')   Wt 106.2 kg (234 lb 2.1 oz)   SpO2 96%   BMI 31.75 kg/m        Intake/Output Summary (Last 24 hours) at 3/8/2020 1619  Last data filed at 3/8/2020 1200  Gross per 24 hour   Intake 480 ml   Output --   Net 480 ml       Constitutional: Awake, alert, cooperative, no apparent distress   Respiratory: Clear to auscultation bilaterally, no crackles or wheezing   Cardiovascular: Regular rate and rhythm, normal S1 and S2, and no murmur noted   Abdomen: Normal bowel sounds, soft, non-distended, non-tender   Skin: No rashes, no cyanosis, dry to touch   Neuro: Alert and oriented x3, no weakness, numbness, memory loss   Extremities: No edema, normal range of motion   Other(s):        All other systems: Negative          Medications:      All current medications were reviewed with changes reflected in problem list.         Data:      All new lab and imaging data was reviewed.   Labs:  Recent Labs   Lab 03/06/20  1241   WBC 5.3   HGB 10.6*   HCT 35.4*   MCV 70*         Imaging:   No results found for this or any previous visit (from the past 24 hour(s)).

## 2020-03-08 NOTE — PHARMACY-VANCOMYCIN DOSING SERVICE
Pharmacy Vancomycin Note  Date of Service 2020  Patient's  1980   39 year old, male    Indication: Meningitis  Goal Trough Level: 15-20 mg/L  Day of Therapy: 2  Current Vancomycin regimen:  1250 mg IV q8h    Current estimated CrCl = Estimated Creatinine Clearance: 126.1 mL/min (based on SCr of 0.99 mg/dL).    Creatinine for last 3 days  3/6/2020: 12:41 PM Creatinine 1.00 mg/dL  3/7/2020:  8:08 AM Creatinine 0.99 mg/dL    Recent Vancomycin Levels (past 3 days)  3/7/2020:  7:18 PM Vancomycin Level 11.6 mg/L    Vancomycin IV Administrations (past 72 hours)                   vancomycin (VANCOCIN) 1,250 mg in sodium chloride 0.9 % 250 mL intermittent infusion (mg) 1,250 mg New Bag 206     1,250 mg New Bag  1228     1,250 mg New Bag  0433    vancomycin (VANCOCIN) 2,500 mg in sodium chloride 0.9 % 500 mL intermittent infusion (mg) 2,500 mg New Bag 20 1804                Nephrotoxins and other renal medications (From now, onward)    Start     Dose/Rate Route Frequency Ordered Stop    20 0400  vancomycin 1500 mg in 0.9% NaCl 250 ml intermittent infusion 1,500 mg      1,500 mg  over 90 Minutes Intravenous EVERY 8 HOURS 203      20 1916  naproxen (NAPROSYN) tablet 250 mg      250 mg Oral 2 TIMES DAILY PRN 20 1920               Contrast Orders - past 72 hours (72h ago, onward)    None          Interpretation of levels and current regimen:  Trough level is  Subtherapeutic    Has serum creatinine changed > 50% in last 72 hours: No    Renal Function: Stable    Plan:  1.  Increase Dose to 1500mg iv q8h  2.  Pharmacy will check trough levels as appropriate in 1-3 Days.    3. Serum creatinine levels will be checked as ordered.    Solis Graves RPH        .

## 2020-03-08 NOTE — PROGRESS NOTES
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    HIV neg Dec 2017  T spot was neg Dec 2017    3/8/2020  INTERIM UPDATE    Still w low grade fever & headache  No new skin lesions  No abdl pain / nausea / emesis / diarrhea  No new myalgias  No new cough / shortness of breath  No new urinary symptoms  No new sores inside mouth    Remainder of 14-point ROS was negative except as listed above    PFSH:  Personal / Family / Social Histories were reviewed and updated  nothing new  Vital Signs: /89 (BP Location: Right arm)   Pulse 72   Temp 98.6  F (37  C) (Temporal)   Resp 18   Ht 1.829 m (6')   Wt 106.2 kg (234 lb 2.1 oz)   SpO2 96%   BMI 31.75 kg/m    EXAM    general   In bed     psyche   Somber look on face     neuro   Normal speech  Moves all limbs  Symmetrical face  Supple neck     lungs   clear     CV   RRR     skin   Nothing new noted   abd   Large / soft       Data  Image    3.8 Head MRI I personally did not see major pathology (I reviewed myself) but reading pending    Cultures    CSF culture neg     LABS  Lab Results   Component Value Date/Time    WBC 5.3 03/06/2020 12:41 PM    WBC 10.3 10/12/2014 10:30 PM    WBC 4.9 07/20/2009 12:10 AM    AST 31 10/12/2014 10:30 PM    AST 38 07/20/2009 12:10 AM    ALT 31 10/12/2014 10:30 PM    ALT 28 07/20/2009 12:10 AM    ALKPHOS 64 10/12/2014 10:30 PM    ALKPHOS 80 07/20/2009 12:10 AM               ASSESSMENT & SUGGESTIONS    (G03.9) Meningitis   (primary encounter diagnosis)  (B34.9) Viral syndrome  (R51) Acute nonintractable headache, unspecified headache type  (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    No specific risk for unusual exposure noted  DDx includes infection (viral / Lyme / fungal), tumor, CTD (Sarcoid / lupus / etc)  No clue to systemic condition  Not better after empiric broad spectrum antimicrobial agents     Check histo / crypto tests  Follow-up formal head MRI reading  Consider repeat LP  Cytology  Cell count / diff  Glu / protein  Save fluid for potential other testing (PCRs, cultures, ...)  dw Dr AUGUSTINE    TT 38 min  > 50 % CC           Yuriy Fu MD  Covering for Devin Alcantar & Rajesh  Infectious Disease service    CURRENT MED REVIEWD  Current Facility-Administered Medications   Medication     acetaminophen (TYLENOL) tablet 650 mg     bisacodyl (DULCOLAX) Suppository 10 mg     calcium carbonate (TUMS) chewable tablet 1,000 mg     cefTRIAXone (ROCEPHIN) 2 g vial to attach to  ml bag for ADULTS or NS 50 ml bag for PEDS     hydroxychloroquine (PLAQUENIL) tablet 400 mg     lactated ringers infusion     lidocaine (LMX4) cream     lidocaine 1 % 0.1-1 mL     magnesium hydroxide (MILK OF MAGNESIA) suspension 30 mL     melatonin tablet 1 mg     morphine (PF) injection 2-4 mg     naloxone (NARCAN) injection 0.1-0.4 mg     naproxen (NAPROSYN) tablet 250 mg     ondansetron (ZOFRAN-ODT) ODT tab 4 mg    Or     ondansetron (ZOFRAN) injection 4 mg     oxyCODONE (ROXICODONE) tablet 5-10 mg     potassium chloride (KLOR-CON) Packet 20-40 mEq     potassium chloride 10 mEq in 100 mL intermittent infusion with 10 mg lidocaine     potassium chloride 10 mEq in 100 mL sterile water intermittent infusion (premix)     potassium chloride 20 mEq in 50 mL intermittent infusion     potassium chloride ER (KLOR-CON M) CR tablet 20-40 mEq     prochlorperazine (COMPAZINE) injection 10 mg    Or     prochlorperazine (COMPAZINE) tablet 10 mg    Or     prochlorperazine (COMPAZINE)  Suppository 25 mg     senna-docusate (SENOKOT-S/PERICOLACE) 8.6-50 MG per tablet 1 tablet    Or     senna-docusate (SENOKOT-S/PERICOLACE) 8.6-50 MG per tablet 2 tablet     sodium chloride (PF) 0.9% PF flush 3 mL     sodium chloride (PF) 0.9% PF flush 3 mL

## 2020-03-08 NOTE — PLAN OF CARE
Pt A/O x4. Pt reporting strong headache- managing with Oxycodone. Droplet and contact precautions maintained. Dressing to lumbar site- CDI. IV infusing. Up independently. Family at bedside. Voiding and drinking adequate amounts. Plan is discharge to home today.

## 2020-03-08 NOTE — PLAN OF CARE
Evening RN  Pt A/O x4.  VSS with low grade temp.  Pain managed adequately with PRN PO oxycodone (5mg) x1 for a strong headache.  Cough is very infrequent, lung sounds clear.  CMS intact.  Dressing CDI on back from lumbar punture.  Up independently.  Voiding adequately.  Tolerating regular diet well.  IV ceftriaxone and vancomycin given.  Contact and droplet precautions maintained.  Plan is home on discharge.  Will continue to monitor.

## 2020-03-08 NOTE — CONSULTS
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    HIV neg Dec 2017  T spot was neg Dec 2017      ROS    Review of systems / symptoms    yes   Fever      yes   Chills      yes   Shakes (rigors)      no Night sweats      no SOB      no Cough      no Chest pain      no Sputum production      no Nausea      no Emesis      no Abdominal pain      no Diarrhea      no Dysuria      no Blood / pink in urine      no Urinary frequency      no Urinary urgency      YES Headache      some Stiff neck      no Wound      no Sores on skin        OTHER HISTORY  PFSH  Past Medical History:   Diagnosis Date     Meningitis 3/6/2020         Socioeconomic History     Marital status:      Allergies   Allergen Reactions     Bees      Milk [Lac Bovis]      Shellfish Allergy Swelling and Rash           EXAM  BP (!) 145/80   Pulse 77   Temp 99.2  F (37.3  C) (Temporal)   Resp 17   Ht 1.829 m (6')   Wt 106.2 kg (234 lb 2.1 oz)   SpO2 99%   BMI 31.75 kg/m      general   In bed  no acute distress     neuro   Speech & conversation normal  Neck supple  Moves all limbs  Face symmetrical     lungs   clear     mouth   No intraoral mucosal lesions seen     CV   No murmur     abd   Soft  Not tender     psyche   calm, coherent, cooperative, appropriate   extrem   Normal for age & gender       DATA    CSF analysis      Cultures            LABS  Recent Labs   Lab Test  "03/06/20  1241 10/12/14  2230   WBC 5.3 10.3   AST  --  31   ALT  --  31   BILITOTAL  --  0.4   ALKPHOS  --  64           ASSESSMENT   SUGGESTIONS    (G03.9) Meningitis  (primary encounter diagnosis)  (B34.9) Viral syndrome  (R51) Acute nonintractable headache, unspecified headache type  (D50.9) Microcytic anemia    Clinically better  No indication zoster, HSV, enteroviral or bacterial  No epi clue to suggest arboviral / tick-borne / LCM / TB  Potential ill contacts at work ([employer] [place])  No clue to \"extra-CNS\" infection  Difficult to exclude non-infectious (rheum / auto immune / sarcoid / other)    If doesn't cont to improve, consider   chest / abd CT to look for systemic condition  Repeat LP for additional eval of CSF  Head MRI with contrast  Recheck HIV (last done 2017 as far as I can tell)  Other serologic tests (LCMV serology), CSF cytology, rheum (JEREMIAH, RF, ...)  I think can stop antibacterial meds tomorrow (stop vanco now)           Yuriy Fu MD  Covering for Devin Alcantar & Rajesh  Infectious Disease service  Current Meds Reviewed  Current Facility-Administered Medications   Medication     acetaminophen (TYLENOL) tablet 650 mg     bisacodyl (DULCOLAX) Suppository 10 mg     calcium carbonate (TUMS) chewable tablet 1,000 mg     cefTRIAXone (ROCEPHIN) 2 g vial to attach to  ml bag for ADULTS or NS 50 ml bag for PEDS     lactated ringers infusion     lidocaine (LMX4) cream     lidocaine 1 % 0.1-1 mL     magnesium hydroxide (MILK OF MAGNESIA) suspension 30 mL     melatonin tablet 1 mg     morphine (PF) injection 2-4 mg     naloxone (NARCAN) injection 0.1-0.4 mg     naproxen (NAPROSYN) tablet 250 mg     ondansetron (ZOFRAN-ODT) ODT tab 4 mg    Or     ondansetron (ZOFRAN) injection 4 mg     oxyCODONE (ROXICODONE) tablet 5-10 mg     potassium chloride (KLOR-CON) Packet 20-40 mEq     potassium chloride 10 mEq in 100 mL intermittent infusion with 10 mg lidocaine     potassium chloride 10 mEq in 100 mL sterile water " intermittent infusion (premix)     potassium chloride 20 mEq in 50 mL intermittent infusion     potassium chloride ER (KLOR-CON M) CR tablet 20-40 mEq     prochlorperazine (COMPAZINE) injection 10 mg    Or     prochlorperazine (COMPAZINE) tablet 10 mg    Or     prochlorperazine (COMPAZINE) Suppository 25 mg     senna-docusate (SENOKOT-S/PERICOLACE) 8.6-50 MG per tablet 1 tablet    Or     senna-docusate (SENOKOT-S/PERICOLACE) 8.6-50 MG per tablet 2 tablet     sodium chloride (PF) 0.9% PF flush 3 mL     sodium chloride (PF) 0.9% PF flush 3 mL

## 2020-03-09 LAB
CREAT SERPL-MCNC: 0.95 MG/DL (ref 0.66–1.25)
GFR SERPL CREATININE-BSD FRML MDRD: >90 ML/MIN/{1.73_M2}

## 2020-03-09 PROCEDURE — 25000132 ZZH RX MED GY IP 250 OP 250 PS 637: Performed by: INTERNAL MEDICINE

## 2020-03-09 PROCEDURE — 12000000 ZZH R&B MED SURG/OB

## 2020-03-09 PROCEDURE — 36415 COLL VENOUS BLD VENIPUNCTURE: CPT | Performed by: INTERNAL MEDICINE

## 2020-03-09 PROCEDURE — 99232 SBSQ HOSP IP/OBS MODERATE 35: CPT | Performed by: INTERNAL MEDICINE

## 2020-03-09 PROCEDURE — 82565 ASSAY OF CREATININE: CPT | Performed by: INTERNAL MEDICINE

## 2020-03-09 PROCEDURE — 25000128 H RX IP 250 OP 636: Performed by: INTERNAL MEDICINE

## 2020-03-09 RX ADMIN — OXYCODONE HYDROCHLORIDE 5 MG: 5 TABLET ORAL at 14:54

## 2020-03-09 RX ADMIN — OXYCODONE HYDROCHLORIDE 5 MG: 5 TABLET ORAL at 00:28

## 2020-03-09 RX ADMIN — HYDROXYCHLOROQUINE SULFATE 400 MG: 200 TABLET ORAL at 19:53

## 2020-03-09 RX ADMIN — OXYCODONE HYDROCHLORIDE 5 MG: 5 TABLET ORAL at 06:25

## 2020-03-09 RX ADMIN — CEFTRIAXONE 2 G: 2 INJECTION, POWDER, FOR SOLUTION INTRAMUSCULAR; INTRAVENOUS at 06:25

## 2020-03-09 RX ADMIN — OXYCODONE HYDROCHLORIDE 5 MG: 5 TABLET ORAL at 09:44

## 2020-03-09 ASSESSMENT — ACTIVITIES OF DAILY LIVING (ADL)
ADLS_ACUITY_SCORE: 35
ADLS_ACUITY_SCORE: 35
ADLS_ACUITY_SCORE: 10
ADLS_ACUITY_SCORE: 35

## 2020-03-09 NOTE — PROGRESS NOTES
[facility]  Infectious Disease Progress Note          Assessment and Plan:   ID consult dictated IMp 1 38 yo male acute HA/LGF, CSF lymphocytic pleocytosis, has aseptic meningitis, likely viral but large DDX  2 Discoid lupus  3 Latent TB, treated      REc discontinue ABx, discontinue iso, hold on bigger LINDA, symptomatic tx OK disposition if sxs allow, if worsens or recurs larger LINDA, including potentially 2 and 3 above with relavence        Interval History:   no new complaints and doing better HA Ok cognition nl T down cxs all neg.              Medications:       hydroxychloroquine  400 mg Oral Daily with supper     sodium chloride (PF)  3 mL Intracatheter Q8H                  Physical Exam:   Blood pressure (!) 140/78, pulse 71, temperature 98.8  F (37.1  C), temperature source Oral, resp. rate 18, height 1.829 m (6'), weight 106.2 kg (234 lb 2.1 oz), SpO2 99 %.  Wt Readings from Last 2 Encounters:   03/06/20 106.2 kg (234 lb 2.1 oz)   06/30/14 99.8 kg (220 lb)     Vital Signs with Ranges  Temp:  [97.6  F (36.4  C)-98.8  F (37.1  C)] 98.8  F (37.1  C)  Pulse:  [64-72] 71  Resp:  [18] 18  BP: (131-143)/(78-95) 140/78  SpO2:  [96 %-99 %] 99 %    Constitutional: Awake, alert, cooperative, no apparent distress   Lungs: Clear to auscultation bilaterally, no crackles or wheezing   Cardiovascular: Regular rate and rhythm, normal S1 and S2, and no murmur noted   Abdomen: Normal bowel sounds, soft, non-distended, non-tender   Skin: No rashes, no cyanosis, no edema   Other:           Data:   All microbiology laboratory data reviewed.  Recent Labs   Lab Test 03/06/20  1241 10/12/14  2230   WBC 5.3 10.3   HGB 10.6* 13.0*   HCT 35.4* 39.7*   MCV 70* 83    189     Recent Labs   Lab Test 03/09/20  0712 03/08/20  0739 03/07/20  0808   CR 0.95 0.89 0.99     No lab results found.  Recent Labs   Lab Test 03/06/20  1517   CULT Culture negative monitoring continues

## 2020-03-09 NOTE — PROGRESS NOTES
[facility]  Hospitalist Progress Note  Adrienne Sahrma MD 03/09/2020    Reason for Stay (Diagnosis):         Assessment and Plan:      Summary of Stay: Harmeet Rose is a 39 year old male with a history of discoid lupus on chronic hydroxychloroquine, history of latent TB that is previously been treated, admitted on 3/6/2020 with headache, low-grade fever, and CSF showing lymphocytic pleocytosis most consistent with aseptic meningitis.    CSF findings somewhat confusing and that he had a high protein and low glucose which would have been suggestive of bacterial infection although retrospectively is behaving more like a viral illness.    He was placed empirically on ceftriaxone and vancomycin, vancomycin was almost immediately discontinued, and per ID his ceftriaxone discontinued on 3/9/2020.  For now plans are to monitor off antibiotics and if does well likely able to discharge home in the next 1 to 2 days.    Patient definitely feeling better    Problem List:   1. Aseptic meningitis: Some unusual findings in his CSF but I think that still probably the most likely etiology of his symptomatology.  Improving.  Still with some headache that is described as throbbing in nature, partially relieved with low-dose oxycodone.  Appreciate ID input, antibiotics discontinued, isolation was discontinued, and provided he continues to improve okay for discharge.  If symptoms recur will need a larger work-up including possibility of repeat lumbar puncture  2. History of discoid lupus, on chronic hydroxychloroquine-that was resumed.  3. Latent TB: Previously treated.  DVT Prophylaxis: Pneumatic Compression Devices  Code Status: Full Code  Functional Status: independent  Diet:regular texture    [place]: Not present      Disposition Plan   Expected discharge in 1 days to prior living arrangement once symptom-free off antibiotics.     Entered: Adrienne Sharma 03/09/2020, 4:39 PM               Interval History (Subjective):       Doing okay, still with headache.  Describes the headache is circumferential from frontal to occiput.  Throbbing in nature.  No nausea or vomiting no vision changes.  Resolved with low-dose oxycodone    States he is in general feeling better, denies any chest pain or shortness of breath no nausea vomiting                  Physical Exam:      Last Vital Signs:  BP (!) 138/90 (BP Location: Right arm)   Pulse 74   Temp 99.3  F (37.4  C) (Oral)   Resp 16   Ht 1.829 m (6')   Wt 106.2 kg (234 lb 2.1 oz)   SpO2 95%   BMI 31.75 kg/m      Pleasant no acute distress looks stated age head is normocephalic atraumatic and sclera are clear lungs are clear to auscultation exhibits normal respiratory effort heart is of a regular rate and rhythm without murmurs rubs or gallops abdominal exam is soft nontender nondistended skin is warm dry and there is no cyanosis or clubbing of the extremities his affect is appropriate he is alert and oriented and moving all extremities           Medications:      All current medications were reviewed with changes reflected in problem list.         Data:      All new lab and imaging data was reviewed.   Labs:  Recent Labs   Lab 03/09/20  0712  03/06/20  1241   NA  --   --  137   POTASSIUM  --   --  3.7   CHLORIDE  --   --  106   CO2  --   --  26   ANIONGAP  --   --  5   GLC  --   --  75   BUN  --   --  9   CR 0.95   < > 1.00   GFRESTIMATED >90   < > >90   GFRESTBLACK >90   < > >90   JOHNNY  --   --  8.6    < > = values in this interval not displayed.     Recent Labs   Lab 03/06/20  1241   WBC 5.3   HGB 10.6*   HCT 35.4*   MCV 70*         Imaging:

## 2020-03-09 NOTE — PLAN OF CARE
Pt is A/O, afebrile, VSS - on RA. LS clear, voiding adequate amounts. C/o headache- relieved with PO Oxycodone. Tolerating regular diet. Up independently in room. Will continue plan of care.

## 2020-03-09 NOTE — PLAN OF CARE
A/O.  CMS intact.  Has intermittent headache-PRN Oxycodone given x2 today. Continues on IV Rocephin. MRI done this evening. Low grade temp 99.8 this AM.  Labs pending per ID.  Voiding. Up ad luz maria in room.  Has small, raised rash to upper back.  Pt reports at times when he has a flare up he gets a rash.  Restarted on his Plaquenil. Will continue to monitor.

## 2020-03-10 ENCOUNTER — APPOINTMENT (OUTPATIENT)
Dept: GENERAL RADIOLOGY | Facility: CLINIC | Age: 40
DRG: 076 | End: 2020-03-10
Attending: INTERNAL MEDICINE
Payer: COMMERCIAL

## 2020-03-10 VITALS
DIASTOLIC BLOOD PRESSURE: 89 MMHG | OXYGEN SATURATION: 95 % | HEIGHT: 72 IN | SYSTOLIC BLOOD PRESSURE: 142 MMHG | BODY MASS INDEX: 31.71 KG/M2 | WEIGHT: 234.13 LBS | HEART RATE: 65 BPM | RESPIRATION RATE: 16 BRPM | TEMPERATURE: 98.4 F

## 2020-03-10 LAB
BASOPHILS # BLD AUTO: 0 10E9/L (ref 0–0.2)
BASOPHILS NFR BLD AUTO: 1 %
CREAT SERPL-MCNC: 0.86 MG/DL (ref 0.66–1.25)
DIFFERENTIAL METHOD BLD: ABNORMAL
EOSINOPHIL # BLD AUTO: 0.2 10E9/L (ref 0–0.7)
EOSINOPHIL NFR BLD AUTO: 6 %
ERYTHROCYTE [DISTWIDTH] IN BLOOD BY AUTOMATED COUNT: 16.9 % (ref 10–15)
GFR SERPL CREATININE-BSD FRML MDRD: >90 ML/MIN/{1.73_M2}
HCT VFR BLD AUTO: 36.3 % (ref 40–53)
HGB BLD-MCNC: 10.7 G/DL (ref 13.3–17.7)
IMM GRANULOCYTES # BLD: 0 10E9/L (ref 0–0.4)
IMM GRANULOCYTES NFR BLD: 0.3 %
LYMPHOCYTES # BLD AUTO: 1 10E9/L (ref 0.8–5.3)
LYMPHOCYTES NFR BLD AUTO: 24.3 %
MCH RBC QN AUTO: 20.9 PG (ref 26.5–33)
MCHC RBC AUTO-ENTMCNC: 29.5 G/DL (ref 31.5–36.5)
MCV RBC AUTO: 71 FL (ref 78–100)
MONOCYTES # BLD AUTO: 0.3 10E9/L (ref 0–1.3)
MONOCYTES NFR BLD AUTO: 6.8 %
NEUTROPHILS # BLD AUTO: 2.5 10E9/L (ref 1.6–8.3)
NEUTROPHILS NFR BLD AUTO: 61.6 %
NRBC # BLD AUTO: 0 10*3/UL
NRBC BLD AUTO-RTO: 0 /100
PLATELET # BLD AUTO: 248 10E9/L (ref 150–450)
RBC # BLD AUTO: 5.12 10E12/L (ref 4.4–5.9)
WBC # BLD AUTO: 4 10E9/L (ref 4–11)

## 2020-03-10 PROCEDURE — 25000132 ZZH RX MED GY IP 250 OP 250 PS 637: Performed by: INTERNAL MEDICINE

## 2020-03-10 PROCEDURE — 82565 ASSAY OF CREATININE: CPT | Performed by: INTERNAL MEDICINE

## 2020-03-10 PROCEDURE — 77003 FLUOROGUIDE FOR SPINE INJECT: CPT

## 2020-03-10 PROCEDURE — 25000125 ZZHC RX 250

## 2020-03-10 PROCEDURE — 3E0R3GC INTRODUCTION OF OTHER THERAPEUTIC SUBSTANCE INTO SPINAL CANAL, PERCUTANEOUS APPROACH: ICD-10-PCS | Performed by: PHYSICIAN ASSISTANT

## 2020-03-10 PROCEDURE — 85025 COMPLETE CBC W/AUTO DIFF WBC: CPT | Performed by: INTERNAL MEDICINE

## 2020-03-10 PROCEDURE — 99239 HOSP IP/OBS DSCHRG MGMT >30: CPT | Performed by: INTERNAL MEDICINE

## 2020-03-10 PROCEDURE — 36415 COLL VENOUS BLD VENIPUNCTURE: CPT | Performed by: INTERNAL MEDICINE

## 2020-03-10 PROCEDURE — 25500064 ZZH RX 255 OP 636: Performed by: INTERNAL MEDICINE

## 2020-03-10 RX ORDER — LIDOCAINE HYDROCHLORIDE 10 MG/ML
INJECTION, SOLUTION INFILTRATION; PERINEURAL
Status: COMPLETED
Start: 2020-03-10 | End: 2020-03-10

## 2020-03-10 RX ORDER — OXYCODONE HYDROCHLORIDE 5 MG/1
5 TABLET ORAL EVERY 6 HOURS PRN
Qty: 10 TABLET | Refills: 0 | Status: SHIPPED | OUTPATIENT
Start: 2020-03-10

## 2020-03-10 RX ORDER — IOPAMIDOL 408 MG/ML
10 INJECTION, SOLUTION INTRATHECAL ONCE
Status: COMPLETED | OUTPATIENT
Start: 2020-03-10 | End: 2020-03-10

## 2020-03-10 RX ADMIN — IOPAMIDOL 2 ML: 408 INJECTION, SOLUTION INTRATHECAL at 14:07

## 2020-03-10 RX ADMIN — HYDROXYCHLOROQUINE SULFATE 400 MG: 200 TABLET ORAL at 17:06

## 2020-03-10 RX ADMIN — OXYCODONE HYDROCHLORIDE 5 MG: 5 TABLET ORAL at 10:42

## 2020-03-10 RX ADMIN — LIDOCAINE HYDROCHLORIDE 4 ML: 10 INJECTION, SOLUTION INFILTRATION; PERINEURAL at 14:07

## 2020-03-10 ASSESSMENT — ACTIVITIES OF DAILY LIVING (ADL)
ADLS_ACUITY_SCORE: 10

## 2020-03-10 NOTE — DISCHARGE SUMMARY
Discharge Summary  Hospitalist Service    Harmeet Rose MRN# [id number]   YOB: 1980 Age: 39 year old     Date of Admission:  3/6/2020  Date of Discharge:  3/10/2020  Admitting Physician:  Dandre Castaneda MD  Discharge Physician: Adrienne Sharma MD  Discharging Service: Hospitalist Service     Primary Provider:[facility], [place]  Primary Care Physician Phone Number: None         Discharge Diagnoses/Problem Oriented Hospital Course (Providers):    Harmeet Rose was admitted on 3/6/2020 by Dandre Castaneda MD and I would refer you to their history and physical.  The following problems were addressed during his hospitalization:    Aseptic meningitis  Post lumbar puncture headache  History of discoid lupus  History of latent TB             Code Status:      Full Code        Brief Hospital Stay Summary Sent Home With Patient in AVS:        Reason for your hospital stay      Aseptic meningitis, presumed viral           Summary of Stay: Harmeet Rose is a 39 year old male with a history of discoid lupus on chronic hydroxychloroquine, history of latent TB that is previously been treated, admitted on 3/6/2020 with headache, low-grade fever, and CSF showing lymphocytic pleocytosis most consistent with aseptic meningitis.     CSF findings somewhat confusing and that he had a high protein and low glucose which would have been suggestive of bacterial infection although retrospectively is behaving more like a viral illness.     He was placed empirically on ceftriaxone and vancomycin, vancomycin was almost immediately discontinued, and per ID his ceftriaxone discontinued on 3/9/2020.  For now plans are to monitor off antibiotics and if does well likely able to discharge home in the next 1 to 2 days.     Patient definitely feeling better     Problem List:   1. Aseptic meningitis: Some unusual findings in his CSF but I think that still probably the most likely etiology of his symptomatology.   Improving.  Still with some headache that is described as throbbing in nature, partially relieved with low-dose oxycodone.  Appreciate ID input, antibiotics discontinued, isolation was discontinued on 3/9/2020.  Patient has done well he is afebrile his headache is actually changed and now seems most consistent with a post lumbar puncture headache.  2. Post lumbar puncture headache-not described as positional, arranged for blood patch for the afternoon of discharge.  Patient is doing well and plans for discharge home later this evening.  I have given him a limited prescription for oxycodone with recommendations that he take only as directed are run the risk of respiratory depression and death as well as addiction.  He knows to return for evaluation of headache is uncontrollable at home.  I have also suggested the addition of acetaminophen  3. History of discoid lupus, on chronic hydroxychloroquine-that was resumed.  4. Latent TB: Previously treated.  DVT Prophylaxis: Pneumatic Compression Devices  Code Status: Full Code  Functional Status: independent  Diet:regular texture              [place]: Not present              Important Results:      As noted below         Pending Results:        Unresulted Labs Ordered in the Past 30 Days of this Admission     Date and Time Order Name Status Description    3/8/2020 1458 Histoplasma Capsulatum Agn Non Blood In process     3/8/2020 1458 Histoplasma capsulatum antigen In process     3/6/2020 1343 CSF Culture Aerobic Bacterial Preliminary             Discharge Instructions and Follow-Up:      Follow-up Appointments     Follow-up and recommended labs and tests       Follow up with primary MD/clinic in one week for recheck    I have given you a prescription for a limited amount of oxycodone.    Narcotics can be quite dangerous and if taken inappropriately can lead to   respiratory depression and death so take only as prescribed.  Should your   pain not be controlled at that level  you will need to represented to the   hospital for further evaluation.  You can augment the effects of oxycodone   with acetaminophen which you can get over-the-counter.  Narcotics can also   lead to addiction which is also the reason that we limit the amount of   oxycodone that we prescribed at discharge.               Discharge Disposition:      Discharged to home         Discharge Medications:        Current Discharge Medication List      START taking these medications    Details   acetaminophen 500 MG CAPS Take 2 capsules by mouth every 8 hours as needed For aches, pain, fever  Qty: 60 capsule, Refills: 0      guaiFENesin-codeine (ROBITUSSIN AC) 100-10 MG/5ML solution Take 5 mLs by mouth every 4 hours as needed for cough  Qty: 120 mL, Refills: 0      oxyCODONE (ROXICODONE) 5 MG tablet Take 1 tablet (5 mg) by mouth every 6 hours as needed for moderate to severe pain  Qty: 10 tablet, Refills: 0    Associated Diagnoses: Meningitis         CONTINUE these medications which have NOT CHANGED    Details   cholecalciferol 25 MCG (1000 UT) TABS Take 2,000 Units by mouth daily      hydroxychloroquine (PLAQUENIL) 200 MG tablet Take 400 mg by mouth daily (with dinner)      naproxen sodium (ANAPROX) 220 MG tablet Take 220 mg by mouth 2 times daily as needed for moderate pain               Allergies:         Allergies   Allergen Reactions     Bees      Milk [Lac Bovis]      Shellfish Allergy Swelling and Rash           Consultations This Hospital Stay:      Consultation during this admission received from infectious disease         Condition and Physical on Discharge:      Discharge condition: Stable   Vitals: Blood pressure (!) 142/89, pulse 65, temperature 98.4  F (36.9  C), temperature source Temporal, resp. rate 16, height 1.829 m (6'), weight 106.2 kg (234 lb 2.1 oz), SpO2 95 %.     Constitutional:  Pleasant no acute distress looks stated age head is normocephalic atraumatic   Lungs:  CTA bilaterally normal effort    Cardiovascular:  RRR no MRG no lower extremity edema   Abdomen:  Soft nontender nondistended   Skin:  Warm and dry no cyanosis or clubbing   Other:  Affect appropriate he is alert and oriented         Discharge Time:      Greater than 30 minutes.        Image Results From This Hospital Stay (For Non-EPIC Providers):        Results for orders placed or performed during the hospital encounter of 03/06/20   XR Chest 2 Views    Narrative    CHEST TWO VIEWS 3/6/2020 3:54 PM     HISTORY: Fever.    COMPARISON: October 12, 2014       Impression    IMPRESSION: There are no acute infiltrates. The cardiac silhouette is  not enlarged. Pulmonary vasculature is unremarkable.    SHANNAN CHATTERJEE MD   MR Brain w/o & w Contrast    Narrative    EXAM: MR BRAIN W/O and W CONTRAST  LOCATION: [facility]  DATE/TIME: 3/8/2020 5:25 PM    INDICATION: Meningitis  COMPARISON: None.  CONTRAST: 10ml gadavist  TECHNIQUE: Routine multiplanar multisequence head MRI without and with intravenous contrast.    FINDINGS:  INTRACRANIAL CONTENTS: No acute or subacute infarct. No mass, acute hemorrhage, or extra-axial fluid collections. Normal brain parenchymal signal. Normal ventricles and sulci. Normal position of the cerebellar tonsils. No pathologic contrast enhancement.    SELLA: No abnormality accounting for technique.    OSSEOUS STRUCTURES/SOFT TISSUES: Normal marrow signal. The major intracranial vascular flow voids are maintained.     ORBITS: No abnormality accounting for technique.     SINUSES/MASTOIDS: Left frontoethmoidal mucosal thickening. The paranasal sinuses are otherwise clear. No middle ear or mastoid effusion.       Impression    IMPRESSION:  1.  Normal MRI of the brain.  2.  No meningeal enhancement.  3.  Incidental left frontoethmoidal mucosal thickening. No air-fluid level.  4.  The remainder of the paranasal sinuses are clear.  5.  Clear mastoids.               Most Recent Lab Results In EPIC (For Non-EPIC Providers):     Most Recent 3 CBC's:  Recent Labs   Lab Test 03/10/20  0558 03/06/20  1241 10/12/14  2230   WBC 4.0 5.3 10.3   HGB 10.7* 10.6* 13.0*   MCV 71* 70* 83    218 189      Most Recent 3 BMP's:  Recent Labs   Lab Test 03/10/20  0558 03/09/20  0712 03/08/20  0739  03/06/20  1241 10/12/14  2230   NA  --   --   --   --  137 138   POTASSIUM  --   --   --   --  3.7 3.7   CHLORIDE  --   --   --   --  106 105   CO2  --   --   --   --  26 25   BUN  --   --   --   --  9 9   CR 0.86 0.95 0.89   < > 1.00 0.93   ANIONGAP  --   --   --   --  5 8   JOHNNY  --   --   --   --  8.6 8.8   GLC  --   --   --   --  75 83    < > = values in this interval not displayed.     Most Recent 2 LFT's:  Recent Labs   Lab Test 10/12/14  2230   AST 31   ALT 31   ALKPHOS 64   BILITOTAL 0.4     Most Recent 6 Bacteria Isolates From Any Culture (See EPIC Reports for Culture Details):  Recent Labs   Lab Test 03/06/20  1517   CULT Culture negative monitoring continues

## 2020-03-10 NOTE — PROCEDURES
[facility]    Procedure: Epidural blood patch    Date/Time: 3/10/2020 1:44 PM  Performed by: Trupti Duran PA-C  Authorized by: Trupti Duran PA-C     UNIVERSAL PROTOCOL   Site Marked: Yes  Prior Images Obtained and Reviewed:  Yes  Required items: Required blood products, implants, devices and special equipment available    Patient identity confirmed:  Verbally with patient  NA - No sedation, light sedation, or local anesthesia  Confirmation Checklist:  Patient's identity using two indicators, relevant allergies, procedure was appropriate and matched the consent or emergent situation and correct equipment/implants were available  Time out: Immediately prior to the procedure a time out was called    Universal Protocol: the Joint Commission Universal Protocol was followed    Preparation: Patient was prepped and draped in usual sterile fashion           ANESTHESIA    Anesthesia: Local infiltration  Local Anesthetic:  Lidocaine 1% without epinephrine  Anesthetic Total (mL):  4      SEDATION    Patient Sedated: No    See dictated procedure note for full details.  PROCEDURE   Patient Tolerance:  Patient tolerated the procedure well with no immediate complications  Describe Procedure: Lumbar epidural blood patch at L3-4  14mL autologous blood injected    Recommend patient lay with head of the bed flat for the remainder of the day, except for meals and bathroom privileges. PO fluids encouraged. Discussed these recommendations with patient as he is expecting to be discharged this afternoon.      Length of time physician/provider present for 1:1 monitoring during sedation: 0

## 2020-03-10 NOTE — PLAN OF CARE
PT discharged at 620pm. Discharge instructions and education gone over with pt and family. All belongings gone over w/ pt, is not missing anything. Discharged w/ oxycodone- pt declined the rx for robitussin w/ codeine and tylenol.

## 2020-03-10 NOTE — PLAN OF CARE
VSS, mild headache. Pt had blood patch done today, has been laying flat when not eating or ambulating. Discharge instructions given for blood patch activity recommendations, work note provided from Dr. Sharma, and rx for oxycodone given. Education provided. Pt discharging home with family.

## 2020-03-10 NOTE — PLAN OF CARE
Patient A/O X 4, VSS, temp. 99.0. Denies pain, denies headache. Tolerating regular diet. Continent of bowel and bladder. Independent. Discharge 3/10 to home if no fever.  scheduled for meeting w/MD.

## 2020-03-10 NOTE — PROGRESS NOTES
[facility]  Infectious Disease Progress Note          Assessment and Plan:   ID consult dictated IMp 1 38 yo male acute HA/LGF, CSF lymphocytic pleocytosis, has aseptic meningitis, likely viral but large DDX  2 Discoid lupus  3 Latent TB, treated      REc 1 off ABx, discontinue iso, hold on bigger LINDA, symptomatic tx OK disposition if sxs allow, if worsens or recurs larger LINDA, including potentially 2 and 3 above with relavence, likely Ok disposition        Interval History:   no new complaints and doing better HA Ok cognition nl T down  consistentlycxs all neg.              Medications:       hydroxychloroquine  400 mg Oral Daily with supper     lidocaine         sodium chloride (PF)  3 mL Intracatheter Q8H                  Physical Exam:   Blood pressure 129/74, pulse 65, temperature 98.3  F (36.8  C), temperature source Oral, resp. rate 17, height 1.829 m (6'), weight 106.2 kg (234 lb 2.1 oz), SpO2 97 %.  Wt Readings from Last 2 Encounters:   03/06/20 106.2 kg (234 lb 2.1 oz)   06/30/14 99.8 kg (220 lb)     Vital Signs with Ranges  Temp:  [98.3  F (36.8  C)-99.3  F (37.4  C)] 98.3  F (36.8  C)  Pulse:  [65-74] 65  Heart Rate:  [69] 69  Resp:  [16-17] 17  BP: (129-138)/(74-90) 129/74  SpO2:  [95 %-100 %] 97 %    Constitutional: Awake, alert, cooperative, no apparent distress   Lungs: Clear to auscultation bilaterally, no crackles or wheezing   Cardiovascular: Regular rate and rhythm, normal S1 and S2, and no murmur noted   Abdomen: Normal bowel sounds, soft, non-distended, non-tender   Skin: No rashes, no cyanosis, no edema   Other:           Data:   All microbiology laboratory data reviewed.  Recent Labs   Lab Test 03/10/20  0558 03/06/20  1241 10/12/14  2230   WBC 4.0 5.3 10.3   HGB 10.7* 10.6* 13.0*   HCT 36.3* 35.4* 39.7*   MCV 71* 70* 83    218 189     Recent Labs   Lab Test 03/10/20  0558 03/09/20  0712 03/08/20  0739   CR 0.86 0.95 0.89     No lab results found.  Recent Labs    Lab Test 03/06/20  1517   CULT Culture negative monitoring continues

## 2020-03-10 NOTE — PLAN OF CARE
VSS, HA controlled with oxycodone x1  Up ad luz maria, tolerating regular diet  IR for blood patch, flat for 1h, no blood visualized on Band-Aid.  To discharge this evening to home

## 2020-03-10 NOTE — PLAN OF CARE
PM SHIFT  Pt complains of min headache that he rates only at 1 out of 10. Temp max this shift 99.3. pt up in room ind. Pt neuro check done and all with in normal limits. Plan is possible home tomorrow.

## 2020-03-10 NOTE — PROGRESS NOTES
Epidural lumbar blood patch completed per Trupti BLANCHARD without difficulty, patient tolerated well. Patient transferred to room via cart in stable condition. See also imaging dictation and post procedure orders.

## 2020-03-11 LAB
BACTERIA SPEC CULT: NO GROWTH
SPECIMEN SOURCE: NORMAL

## 2020-03-12 LAB
LAB SCANNED RESULT: NORMAL
LAB SCANNED RESULT: NORMAL

## 2023-12-07 ENCOUNTER — HOSPITAL ENCOUNTER (EMERGENCY)
Facility: CLINIC | Age: 43
Discharge: HOME | End: 2023-12-07
Attending: EMERGENCY MEDICINE | Admitting: EMERGENCY MEDICINE
Payer: COMMERCIAL

## 2023-12-07 ENCOUNTER — APPOINTMENT (OUTPATIENT)
Dept: ULTRASOUND IMAGING | Facility: CLINIC | Age: 43
End: 2023-12-07
Attending: EMERGENCY MEDICINE
Payer: COMMERCIAL

## 2023-12-07 VITALS
SYSTOLIC BLOOD PRESSURE: 133 MMHG | RESPIRATION RATE: 18 BRPM | OXYGEN SATURATION: 98 % | HEART RATE: 69 BPM | TEMPERATURE: 98.1 F | DIASTOLIC BLOOD PRESSURE: 105 MMHG

## 2023-12-07 DIAGNOSIS — M79.89 LEG SWELLING: ICD-10-CM

## 2023-12-07 LAB
ALBUMIN SERPL BCG-MCNC: 4.3 G/DL (ref 3.5–5.2)
ALBUMIN UR-MCNC: NEGATIVE MG/DL
ALP SERPL-CCNC: 75 U/L (ref 40–150)
ALT SERPL W P-5'-P-CCNC: 53 U/L (ref 0–70)
ANION GAP SERPL CALCULATED.3IONS-SCNC: 8 MMOL/L (ref 7–15)
APPEARANCE UR: CLEAR
AST SERPL W P-5'-P-CCNC: 29 U/L (ref 0–45)
BASOPHILS # BLD AUTO: 0 10E3/UL (ref 0–0.2)
BASOPHILS NFR BLD AUTO: 0 %
BILIRUB SERPL-MCNC: 0.4 MG/DL
BILIRUB UR QL STRIP: NEGATIVE
BUN SERPL-MCNC: 9.9 MG/DL (ref 6–20)
CALCIUM SERPL-MCNC: 9.2 MG/DL (ref 8.6–10)
CHLORIDE SERPL-SCNC: 101 MMOL/L (ref 98–107)
COLOR UR AUTO: ABNORMAL
CREAT SERPL-MCNC: 0.81 MG/DL (ref 0.67–1.17)
DEPRECATED HCO3 PLAS-SCNC: 27 MMOL/L (ref 22–29)
EGFRCR SERPLBLD CKD-EPI 2021: >90 ML/MIN/1.73M2
EOSINOPHIL # BLD AUTO: 0.1 10E3/UL (ref 0–0.7)
EOSINOPHIL NFR BLD AUTO: 2 %
ERYTHROCYTE [DISTWIDTH] IN BLOOD BY AUTOMATED COUNT: 14.8 % (ref 10–15)
GLUCOSE SERPL-MCNC: 78 MG/DL (ref 70–99)
GLUCOSE UR STRIP-MCNC: NEGATIVE MG/DL
HCT VFR BLD AUTO: 43.5 % (ref 40–53)
HGB BLD-MCNC: 13.9 G/DL (ref 13.3–17.7)
HGB UR QL STRIP: NEGATIVE
IMM GRANULOCYTES # BLD: 0 10E3/UL
IMM GRANULOCYTES NFR BLD: 0 %
KETONES UR STRIP-MCNC: NEGATIVE MG/DL
LEUKOCYTE ESTERASE UR QL STRIP: NEGATIVE
LYMPHOCYTES # BLD AUTO: 0.9 10E3/UL (ref 0.8–5.3)
LYMPHOCYTES NFR BLD AUTO: 28 %
MCH RBC QN AUTO: 26.9 PG (ref 26.5–33)
MCHC RBC AUTO-ENTMCNC: 32 G/DL (ref 31.5–36.5)
MCV RBC AUTO: 84 FL (ref 78–100)
MONOCYTES # BLD AUTO: 0.3 10E3/UL (ref 0–1.3)
MONOCYTES NFR BLD AUTO: 9 %
NEUTROPHILS # BLD AUTO: 1.9 10E3/UL (ref 1.6–8.3)
NEUTROPHILS NFR BLD AUTO: 61 %
NITRATE UR QL: NEGATIVE
NRBC # BLD AUTO: 0 10E3/UL
NRBC BLD AUTO-RTO: 0 /100
PH UR STRIP: 7.5 [PH] (ref 5–7)
PLATELET # BLD AUTO: 169 10E3/UL (ref 150–450)
POTASSIUM SERPL-SCNC: 4.2 MMOL/L (ref 3.4–5.3)
PROT SERPL-MCNC: 8.4 G/DL (ref 6.4–8.3)
RBC # BLD AUTO: 5.16 10E6/UL (ref 4.4–5.9)
RBC URINE: 0 /HPF
SODIUM SERPL-SCNC: 136 MMOL/L (ref 135–145)
SP GR UR STRIP: 1.01 (ref 1–1.03)
SQUAMOUS EPITHELIAL: <1 /HPF
UROBILINOGEN UR STRIP-MCNC: NORMAL MG/DL
WBC # BLD AUTO: 3.1 10E3/UL (ref 4–11)
WBC URINE: 0 /HPF

## 2023-12-07 PROCEDURE — 81003 URINALYSIS AUTO W/O SCOPE: CPT | Performed by: EMERGENCY MEDICINE

## 2023-12-07 PROCEDURE — 80053 COMPREHEN METABOLIC PANEL: CPT | Performed by: EMERGENCY MEDICINE

## 2023-12-07 PROCEDURE — 99284 EMERGENCY DEPT VISIT MOD MDM: CPT | Mod: 25

## 2023-12-07 PROCEDURE — 93970 EXTREMITY STUDY: CPT

## 2023-12-07 PROCEDURE — 85025 COMPLETE CBC W/AUTO DIFF WBC: CPT | Performed by: EMERGENCY MEDICINE

## 2023-12-07 PROCEDURE — 36415 COLL VENOUS BLD VENIPUNCTURE: CPT | Performed by: EMERGENCY MEDICINE

## 2023-12-07 ASSESSMENT — ACTIVITIES OF DAILY LIVING (ADL)
ADLS_ACUITY_SCORE: 35
ADLS_ACUITY_SCORE: 35

## 2023-12-07 NOTE — ED TRIAGE NOTES
"Patient states he began having bilateral foot pain last night that is worse in the left. Patient also states that he noticed last night that his urine is \"sparkling\". Patient is concerned something is wrong with his kidney's.      Triage Assessment (Adult)       Row Name 12/07/23 8042          Triage Assessment    Airway WDL WDL        Respiratory WDL    Respiratory WDL WDL        Skin Circulation/Temperature WDL    Skin Circulation/Temperature WDL WDL        Cardiac WDL    Cardiac WDL WDL        Peripheral/Neurovascular WDL    Peripheral Neurovascular WDL WDL        Cognitive/Neuro/Behavioral WDL    Cognitive/Neuro/Behavioral WDL WDL                     "

## 2023-12-08 NOTE — ED PROVIDER NOTES
History     Chief Complaint:  Urinary Frequency       HPI   Harmeet Rose is a 43 year old male presents with urinary frequency and concerned about following his urine.  He also notes that he has swelling to left greater than right leg.  He has noted some cramping pain in the left foot.  He states that this has been an ongoing issue especially during the workweek that he gets swelling in his legs if he does not rest long enough.  History of prior varicose vein ablations to help with this but has not noticed significant improvement.  Reports compliance with the medications.  Denies any chest pain, shortness of breath, fevers, chills, back pain, abdominal pain or other concerns.      Independent Historian:    none    Review of External Notes:  Reviewed PCP note from 2/15/22 regarding chronic medical conditions and management.       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       Physical Exam   Patient Vitals for the past 24 hrs:   BP Temp Temp src Pulse Resp SpO2   12/07/23 2106 (!) 133/105 -- -- 69 18 98 %   12/07/23 1706 (!) 153/91 98.1  F (36.7  C) Oral 75 18 100 %        Physical Exam  General: Resting on the bed.  Head: No obvious trauma to head.  Ears, Nose, Throat:  External ears normal.  Nose normal.    Eyes:  Conjunctivae clear.  Pupils are equal, round, and reactive.   Neck: Normal range of motion.  Neck supple.   CV: Regular rate and rhythm.  No murmurs.      Respiratory: Effort normal and breath sounds normal.  No wheezing or crackles.   Gastrointestinal: Soft.  No distension. There  is no tenderness.  There is no rigidity, no rebound and no guarding.  No CVA tenderness.  Musculoskeletal: Dorsi and plantarflexion 5 out of 5.  Sensation intact to light touch.  Pitting edema left greater than right.  2+ PT pulses noted.  Neuro: Alert. Moving all extremities appropriately.  Normal speech.    Skin: Skin is warm and dry.  No rash noted.     Emergency Department Course     Imaging:  US Lower Extremity Venous Duplex Bilateral   Final Result   IMPRESSION:   1.  No deep venous thrombosis in the bilateral lower extremities.        Report per radiology    Laboratory:  Labs Ordered and Resulted from Time of ED Arrival to Time of ED Departure   ROUTINE UA WITH MICROSCOPIC REFLEX TO CULTURE - Abnormal       Result Value    Color Urine Light Yellow      Appearance Urine Clear      Glucose Urine Negative      Bilirubin Urine Negative      Ketones Urine Negative      Specific Gravity Urine 1.009      Blood Urine Negative      pH Urine 7.5 (*)     Protein Albumin Urine Negative      Urobilinogen Urine Normal      Nitrite Urine Negative      Leukocyte Esterase Urine Negative      RBC Urine 0      WBC Urine 0      Squamous Epithelials Urine <1     COMPREHENSIVE METABOLIC PANEL - Abnormal    Sodium 136      Potassium 4.2      Carbon Dioxide (CO2) 27      Anion Gap 8      Urea Nitrogen 9.9      Creatinine 0.81      GFR Estimate >90      Calcium 9.2      Chloride 101      Glucose 78      Alkaline Phosphatase 75      AST 29      ALT 53      Protein Total 8.4 (*)     Albumin 4.3      Bilirubin Total 0.4     CBC WITH PLATELETS AND DIFFERENTIAL - Abnormal    WBC Count 3.1 (*)     RBC Count 5.16      Hemoglobin 13.9      Hematocrit 43.5      MCV 84      MCH 26.9      MCHC 32.0      RDW 14.8      Platelet Count 169      % Neutrophils 61      % Lymphocytes 28      % Monocytes 9      % Eosinophils 2      % Basophils 0      % Immature Granulocytes 0      NRBCs per 100 WBC 0      Absolute Neutrophils 1.9      Absolute  Lymphocytes 0.9      Absolute Monocytes 0.3      Absolute Eosinophils 0.1      Absolute Basophils 0.0      Absolute Immature Granulocytes 0.0      Absolute NRBCs 0.0          Procedures       Emergency Department Course & Assessments:             Interventions:  Medications - No data to display     Assessments:  1805 I met with patient, obtained history and performed examination.    Independent Interpretation (X-rays, CTs, rhythm strip):  None    Consultations/Discussion of Management or Tests:  I reassessed the patient and discussed results        Social Determinants of Health affecting care:  none     Disposition:  The patient was discharged to home.     Impression & Plan        Medical Decision Makin-year-old male presents the ER with concern for frothy urine and leg swelling.  Vitals are reassuring.  Broad differentials pursued including not limited to electrolyte, metabolic, renal dysfunction, UTI, pyelonephritis, nephrolithiasis, prostatitis, low albumin, DVT, cellulitis, etc.  Overall patient is well-appearing nontoxic.  Exam is reassuring.  He does have some pitting edema in the left greater than the right leg.  Concern for possible DVT but ultrasound is reassuring, no signs of DVT.  Peripheral pulses intact, neurovascular intact.  No signs of neurovascular compromise.  No erythema, warmth or induration to indicate cellulitis.  CBC without significant leukocytosis, anemia.  BMP without acute electrolyte, metabolic or renal dysfunction.  LFTs are reassuring, normal protein, do not suspect malnutrition.  UA without infection.  No signs of protein on urine sample to indicate nephritis or glomerulonephritis.  Suspect likely venous insufficiency causing leg swelling.  For urinary frequency I have recommended follow-up with urology.  Discussed compression stockings, elevation and follow-up with PCP regarding leg swelling.  He is agreeable and was discharged home.  Diagnosis:    ICD-10-CM    1. Leg swelling   M79.89 Compression Sleeve/Stocking Order for DME - ONLY FOR DME           Discharge Medications:  Discharge Medication List as of 12/7/2023  9:09 PM             12/7/2023   Marta Macdonald MD Bennett, Jennifer L, MD  12/08/23 0208

## 2023-12-08 NOTE — DISCHARGE INSTRUCTIONS
If you have increasing swelling, pain, numbness or tingling, shortness of breath or chest pain, lightheaded or dizzy, fevers or other acute changes return to the ED.      Try compression stockings and elevate the legs

## 2025-06-19 NOTE — PROGRESS NOTES
Alert and oriented, lung sounds clear, up independently in the room.Tolerating regular diet, passing flatus,+ve bowel sounds. Pain controled with prn Oxy.IV infusing,continues on ABX Rocephin q24hrs. Voiding adequate amounts in the bathroom.Family by bedside.Willl continue to monitor.    No